# Patient Record
Sex: MALE | Race: WHITE | Employment: FULL TIME | URBAN - METROPOLITAN AREA
[De-identification: names, ages, dates, MRNs, and addresses within clinical notes are randomized per-mention and may not be internally consistent; named-entity substitution may affect disease eponyms.]

---

## 2024-11-15 ENCOUNTER — HOSPITAL ENCOUNTER (OUTPATIENT)
Dept: MRI IMAGING | Age: 64
Discharge: HOME | End: 2024-11-17
Payer: COMMERCIAL

## 2024-11-15 DIAGNOSIS — M47.816 LUMBAR SPONDYLOSIS: ICD-10-CM

## 2024-11-15 DIAGNOSIS — M48.061 FORAMINAL STENOSIS OF LUMBAR REGION: ICD-10-CM

## 2024-11-15 DIAGNOSIS — M54.50 LUMBAR PAIN: ICD-10-CM

## 2024-11-15 PROCEDURE — 72148 MRI LUMBAR SPINE W/O DYE: CPT

## 2025-03-13 ENCOUNTER — OFFICE VISIT (OUTPATIENT)
Facility: CLINIC | Age: 65
End: 2025-03-13
Payer: COMMERCIAL

## 2025-03-13 ENCOUNTER — HOSPITAL ENCOUNTER (OUTPATIENT)
Dept: RADIOLOGY | Facility: HOSPITAL | Age: 65
Discharge: HOME | End: 2025-03-13
Payer: COMMERCIAL

## 2025-03-13 VITALS
SYSTOLIC BLOOD PRESSURE: 148 MMHG | BODY MASS INDEX: 38.45 KG/M2 | TEMPERATURE: 97.7 F | HEIGHT: 68 IN | WEIGHT: 253.7 LBS | HEART RATE: 75 BPM | DIASTOLIC BLOOD PRESSURE: 86 MMHG

## 2025-03-13 DIAGNOSIS — R29.818 NEUROGENIC CLAUDICATION: ICD-10-CM

## 2025-03-13 DIAGNOSIS — R26.9 NEUROLOGIC GAIT DYSFUNCTION: ICD-10-CM

## 2025-03-13 DIAGNOSIS — M43.10 SPONDYLOLISTHESIS, GRADE 2: ICD-10-CM

## 2025-03-13 DIAGNOSIS — M47.816 LUMBAR SPONDYLOSIS: ICD-10-CM

## 2025-03-13 DIAGNOSIS — M48.061 DEGENERATIVE LUMBAR SPINAL STENOSIS: ICD-10-CM

## 2025-03-13 DIAGNOSIS — M47.816 LUMBAR SPONDYLOSIS: Primary | ICD-10-CM

## 2025-03-13 PROCEDURE — 3008F BODY MASS INDEX DOCD: CPT | Performed by: NEUROLOGICAL SURGERY

## 2025-03-13 PROCEDURE — 72120 X-RAY BEND ONLY L-S SPINE: CPT

## 2025-03-13 PROCEDURE — 99204 OFFICE O/P NEW MOD 45 MIN: CPT | Performed by: NEUROLOGICAL SURGERY

## 2025-03-13 PROCEDURE — 1159F MED LIST DOCD IN RCRD: CPT | Performed by: NEUROLOGICAL SURGERY

## 2025-03-13 PROCEDURE — 99214 OFFICE O/P EST MOD 30 MIN: CPT | Mod: GC | Performed by: NEUROLOGICAL SURGERY

## 2025-03-13 PROCEDURE — 1125F AMNT PAIN NOTED PAIN PRSNT: CPT | Performed by: NEUROLOGICAL SURGERY

## 2025-03-13 PROCEDURE — 1036F TOBACCO NON-USER: CPT | Performed by: NEUROLOGICAL SURGERY

## 2025-03-13 RX ORDER — ATENOLOL 25 MG/1
25 TABLET ORAL DAILY
COMMUNITY

## 2025-03-13 RX ORDER — TRIAMCINOLONE ACETONIDE 1 MG/G
CREAM TOPICAL 2 TIMES DAILY PRN
COMMUNITY
Start: 2024-08-12

## 2025-03-13 RX ORDER — ENALAPRIL MALEATE 20 MG/1
20 TABLET ORAL DAILY
COMMUNITY

## 2025-03-13 RX ORDER — TESTOSTERONE CYPIONATE 200 MG/ML
INJECTION, SOLUTION INTRAMUSCULAR
COMMUNITY
Start: 2024-12-06

## 2025-03-13 ASSESSMENT — PATIENT HEALTH QUESTIONNAIRE - PHQ9
1. LITTLE INTEREST OR PLEASURE IN DOING THINGS: NOT AT ALL
SUM OF ALL RESPONSES TO PHQ9 QUESTIONS 1 & 2: 0
2. FEELING DOWN, DEPRESSED OR HOPELESS: NOT AT ALL

## 2025-03-13 ASSESSMENT — PAIN SCALES - GENERAL: PAINLEVEL_OUTOF10: 4

## 2025-03-13 NOTE — PROGRESS NOTES
[facility] [place]  Department of Neurological Surgery  New Patient Visit    History of Present Illness:  Jared Cox is a 64 y.o. year old male who presents to the spine clinic with neurogenic claudication    64-year-old male who is presenting with years of neurogenic claudication.  He states that he has had worsening of his symptoms over the last 5 years.  States that he has some cramping pain in his legs while walking, it is relieved by rest.  States that he feels relief when he flexes forward, especially when he is walking around the store with a shopping cart or doing dishes.  States that he has been having worsening gait instability over the last few years as well.  Does endorse some mild decrease sensation in his hands.  Also states that he has some difficulty buttoning his clothes.  No difficulty feeding himself.  No bowel bladder incontinence.  States that he has known about his L5-S1 spondee for a few years now because he see a chiropractor who showed him he had 1.  He states that he sometimes has a foot drop though this is intermittent.  Says that this has been for many years.    Prior spine surgeries: No  Prior physical therapy: No  Prior spinal injections: No    Diabetic: Yes  Osteoporosis: No  BMI: Body mass index is 38.57 kg/m².    Review of systems negative other than above    There is no problem list on file for this patient.    No past medical history on file.  No past surgical history on file.  Social History     Tobacco Use    Smoking status: Never    Smokeless tobacco: Never   Substance Use Topics    Alcohol use: Not Currently     family history is not on file.    Current Outpatient Medications:     atenolol (Tenormin) 25 mg tablet, Take 1 tablet (25 mg) by mouth once daily., Disp: , Rfl:     enalapril (Vasotec) 20 mg tablet, Take 1 tablet (20 mg) by mouth once daily., Disp: , Rfl:     testosterone cypionate (Depo-Testosterone) 200 mg/mL injection, inject 1 milliliter  intramuscularly EVERY 10 DAYS, Disp: , Rfl:     triamcinolone (Kenalog) 0.1 % cream, Apply topically 2 times a day as needed., Disp: , Rfl:   No Known Allergies    Physical Examination  General: well developed, awake/alert/oriented x3, no distress, alert and cooperative  Head/Neck: neck Supple, no apparent injury  ENMT: mucous membranes moist, no apparent injury, no lesions seen  Cardiovascular: no pitting edema, no JVD  Respiratory/Thorax: Normal breath sounds with good chest expansion, thorax symmetric  Skin: warm and dry, no lesions, no rashes    Neurological/Musculoskeletal:    - Posture: normal coronal & sagittal alignment    - Range of motion: full active & passive range of motion    - Muscle Bulk: normal and symmetric in all extremities    - Paraspinal muscle spasm/tenderness absent    - Motor Strength: 5/5 throughout all extremities    - Sensation: intact to light touch though decreased in bilateral hands.    - Reflexes: normal, no clonus, negative Hairston's sign      Results  I personally reviewed the MRI of the lumbar spine performed on November 15, 2024.  This shows a grade 2 anterolisthesis at L5-S1.  There is autofusion across the disc base.  There is severe central, lateral recess and foraminal stenosis at this level.  There is also a central disc bulge at L4-5.  There is moderate central stenosis at this level and moderate to severe left foraminal stenosis.  There is also a large disc bulge at T12-L1.  This appears to result in at least moderate to severe central stenosis, although this level is not included in the axial slices.      Assessment and Plan:  Jared Cox is a 64 y.o. year old male who presents to the spine clinic with neurogenic claudication    Overall patient is coming in with slowly progressive symptoms though unable to determine if all of this is related to his L5-S1 spondy.  Discussed that he does have signs and symptoms of mild myelopathy, and that he would benefit from further  imaging to delineate this.  Will plan to obtain an MRI C and T-spine without contrast to determine if there are any compressive lesions within the spinal cord.  Will also obtain a lumbar spine flexion-extension film to determine if there is any pathologic movement.  Will also place a referral to physical therapy in the interim.  Patient should return to clinic in 8 to 12 weeks after imaging has been done and he has trialed physical therapy to determine what surgical intervention needs done.        Mic Harris MD PhD  Attending Neurosurgeon  [facility]   of Neurological Surgery  [facility]  Office: [phone]  Fax: [phone]

## 2025-04-02 ENCOUNTER — HOSPITAL ENCOUNTER (OUTPATIENT)
Dept: RADIOLOGY | Facility: HOSPITAL | Age: 65
Discharge: HOME | End: 2025-04-02
Payer: COMMERCIAL

## 2025-04-02 DIAGNOSIS — M47.816 LUMBAR SPONDYLOSIS: ICD-10-CM

## 2025-04-02 PROCEDURE — 72146 MRI CHEST SPINE W/O DYE: CPT

## 2025-04-02 PROCEDURE — 72141 MRI NECK SPINE W/O DYE: CPT

## 2025-05-22 ENCOUNTER — OFFICE VISIT (OUTPATIENT)
Facility: CLINIC | Age: 65
End: 2025-05-22
Payer: COMMERCIAL

## 2025-05-22 VITALS
SYSTOLIC BLOOD PRESSURE: 148 MMHG | HEART RATE: 61 BPM | HEIGHT: 68 IN | DIASTOLIC BLOOD PRESSURE: 90 MMHG | TEMPERATURE: 97.9 F | WEIGHT: 256.7 LBS | BODY MASS INDEX: 38.91 KG/M2

## 2025-05-22 DIAGNOSIS — M48.061 DEGENERATIVE LUMBAR SPINAL STENOSIS: ICD-10-CM

## 2025-05-22 DIAGNOSIS — M43.17 SPONDYLOLISTHESIS AT L5-S1 LEVEL: ICD-10-CM

## 2025-05-22 DIAGNOSIS — R29.818 NEUROGENIC CLAUDICATION: ICD-10-CM

## 2025-05-22 DIAGNOSIS — M47.14 SPONDYLOSIS WITH MYELOPATHY, THORACIC REGION: Primary | ICD-10-CM

## 2025-05-22 PROCEDURE — 1159F MED LIST DOCD IN RCRD: CPT | Performed by: NEUROLOGICAL SURGERY

## 2025-05-22 PROCEDURE — 1125F AMNT PAIN NOTED PAIN PRSNT: CPT | Performed by: NEUROLOGICAL SURGERY

## 2025-05-22 PROCEDURE — 99214 OFFICE O/P EST MOD 30 MIN: CPT | Performed by: NEUROLOGICAL SURGERY

## 2025-05-22 PROCEDURE — 3008F BODY MASS INDEX DOCD: CPT | Performed by: NEUROLOGICAL SURGERY

## 2025-05-22 PROCEDURE — 1036F TOBACCO NON-USER: CPT | Performed by: NEUROLOGICAL SURGERY

## 2025-05-22 RX ORDER — TADALAFIL 20 MG/1
20 TABLET ORAL DAILY PRN
COMMUNITY
Start: 2025-04-18

## 2025-05-22 RX ORDER — LINAGLIPTIN AND METFORMIN HYDROCHLORIDE 5; 1000 MG/1; MG/1
1 TABLET, FILM COATED, EXTENDED RELEASE ORAL DAILY
COMMUNITY
Start: 2025-04-18

## 2025-05-22 RX ORDER — RAMELTEON 8 MG/1
8 TABLET ORAL NIGHTLY
COMMUNITY
Start: 2025-04-18 | End: 2026-04-18

## 2025-05-22 ASSESSMENT — PATIENT HEALTH QUESTIONNAIRE - PHQ9
2. FEELING DOWN, DEPRESSED OR HOPELESS: NOT AT ALL
1. LITTLE INTEREST OR PLEASURE IN DOING THINGS: NOT AT ALL
SUM OF ALL RESPONSES TO PHQ9 QUESTIONS 1 & 2: 0

## 2025-05-22 ASSESSMENT — PAIN SCALES - GENERAL: PAINLEVEL_OUTOF10: 3

## 2025-05-22 NOTE — PROGRESS NOTES
"[facility] [place]  Department of Neurological Surgery  Established Patient Visit    History of Present Illness  Jared Cox is a 64 y.o. year old male who presents to the spine clinic in follow up with chronic progressive balance/gait dysfunction.  He describes worsening heaviness throughout both legs as well as a \"pressure\" sensation in his lower back.  He also describes cramping pain throughout both legs.  His prior lumbar MRI showed a grade 3 anterolisthesis at L5-S1 with associated pars defects.  There is severe central stenosis at this level.  When he last saw him in March 2025, I had ordered MRIs of the cervical and thoracic spine for further evaluation.  He is here today to go over the results of those studies and for ongoing treatment discussion.  He denies any significant neck pain or any upper extremity symptoms.    BMI: Body mass index is 39.03 kg/m².    14/14 systems reviewed and negative other than what is listed in the history of present illness    Problem List[1]  Medical History[2]  Surgical History[3]  Social History     Tobacco Use    Smoking status: Never    Smokeless tobacco: Never   Substance Use Topics    Alcohol use: Not Currently     family history is not on file.  Current Medications[4]  Allergies[5]    Physical Examination:  General: well developed, awake/alert/oriented x3, no distress, alert and cooperative  Head/Neck: neck Supple, no apparent injury  ENMT: mucous membranes moist, no apparent injury, no lesions seen  Cardiovascular: no pitting edema, no JVD  Respiratory/Thorax: Normal breath sounds with good chest expansion, thorax symmetric  Skin: warm and dry, no lesions, no rashes    Neurological/Musculoskeletal:    - Posture: normal coronal & sagittal alignment    - Range of motion: full active & passive range of motion    - Muscle Bulk: normal and symmetric in all extremities    - Paraspinal muscle spasm/tenderness absent    - Motor Strength: 5/5 throughout all " "extremities    - Sensation: intact to light touch    - Reflexes: normal, no clonus, negative Hairston's sign      Results:  I personally reviewed and interpreted the imaging results, which included MRIs of the cervical and thoracic spine performed on April 2, 2025.  In the cervical spine, there is a broad-based disc bulge at C6-7 there results in some cord formation but no significant stenosis.  There is also some possible cord signal change at C3-4, C4-5, C5-6 and C6-7.  In the thoracic spine, there are large right-sided disc herniations at T11-12 and T12-L1.  The disc herniation at T11-12 has significant rostral migration.  There is significant leftward displacement of the spinal cord at both levels.  There is moderate to severe central stenosis at T11-12 and severe central stenosis at T12-L1.  There is faint cord signal change at these 2 levels.    Assessment and Plan:  Jared Cox is a 64 y.o. year old male who presents to the spine clinic in follow up with chronic progressive balance/gait dysfunction.  He describes worsening heaviness throughout both legs as well as a \"pressure\" sensation in his lower back.  He also describes cramping pain throughout both legs.  His prior lumbar MRI showed a grade 3 anterolisthesis at L5-S1 with associated pars defects.  There is severe central stenosis at this level.  When he last saw him in March 2025, I had ordered MRIs of the cervical and thoracic spine for further evaluation.  He is here today to go over the results of those studies and for ongoing treatment discussion.  He denies any significant neck pain or any upper extremity symptoms.  His cervical MRI shows a broad-based disc bulge at C6-7 there results in some cord formation but no significant stenosis.  There is also some possible cord signal change at C3-4, C4-5, C5-6 and C6-7.  His thoracic MRI shows large right-sided disc herniations at T11-12 and T12-L1.  The disc herniation at T11-12 has significant rostral " migration.  There is significant leftward displacement of the spinal cord at both levels.  There is moderate to severe central stenosis at T11-12 and severe central stenosis at T12-L1.  There is faint cord signal change at these 2 levels.  I had a lengthy discussion with the patient regarding his condition and treatment options.  I told him that I think that he has symptomatic thoracic myelopathy.  I discussed multiple treatment options with the patient, including observation, conservative care and different surgical approaches.  I told him that surgery would likely entail T11-L1 laminectomies and a two-level transpedicular approach for decompression, followed by a T10-L2 posterior fusion.  He has nothing about his options and will let me know if he wants to proceed with surgery.  In the meantime, I am going to order CTs of the thoracic and lumbar spine for further evaluation.      I have reviewed all prior documentation and reviewed the electronic medical record since admission. I have personally have reviewed all advanced imaging not just the reports and used my interpretation as documented as the relevant findings. I have reviewed the risks and benefits of all treatment recommendations listed in this note with the patient and family.       The above clinical summary has been dictated with voice recognition software. It has not been proofread for grammatical errors, typographical mistakes, or other semantic inconsistencies.    Thank you for visiting our office today. It was our pleasure to take part in your healthcare.     Do not hesitate to call with any questions regarding your plan of care after leaving at [phone] M-F 8am-4pm.     To clinicians, thank you very much for this kind referral. It is a privilege to partner with you in the care of your patients. My office would be delighted to assist you with any further consultations or with questions regarding the plan of care outlined. Do not hesitate to  call the office or contact me directly.       Sincerely,      Mic Harris MD PhD  Attending Neurosurgeon  [facility]   of Neurological Surgery  [facility]    [address]  [place], [place] [address] [place] [address]  [address] [facility]  [address]  [place] [address]    Office: [phone]  Fax: [phone]           [1] There is no problem list on file for this patient.  [2] No past medical history on file.  [3] No past surgical history on file.  [4]   Current Outpatient Medications:     atenolol (Tenormin) 25 mg tablet, Take 1 tablet (25 mg) by mouth once daily., Disp: , Rfl:     enalapril (Vasotec) 20 mg tablet, Take 1 tablet (20 mg) by mouth once daily., Disp: , Rfl:     Jentadueto XR 5-1,000 mg tablet, IR - ER, biphasic 24hr, Take 1 tablet by mouth once daily., Disp: , Rfl:     ramelteon (Rozerem) 8 mg tablet, Take 1 tablet (8 mg) by mouth once daily at bedtime., Disp: , Rfl:     tadalafil 20 mg tablet, Take 1 tablet (20 mg) by mouth once daily as needed., Disp: , Rfl:     testosterone cypionate (Depo-Testosterone) 200 mg/mL injection, inject 1 milliliter intramuscularly EVERY 10 DAYS, Disp: , Rfl:     triamcinolone (Kenalog) 0.1 % cream, Apply topically 2 times a day as needed., Disp: , Rfl:   [5] No Known Allergies

## 2025-05-30 DIAGNOSIS — R29.818 NEUROGENIC CLAUDICATION: ICD-10-CM

## 2025-05-30 DIAGNOSIS — M43.17 SPONDYLOLISTHESIS AT L5-S1 LEVEL: ICD-10-CM

## 2025-05-30 DIAGNOSIS — M48.061 SPINAL STENOSIS OF LUMBAR REGION, UNSPECIFIED WHETHER NEUROGENIC CLAUDICATION PRESENT: ICD-10-CM

## 2025-05-30 DIAGNOSIS — M47.14 SPONDYLOSIS WITH MYELOPATHY, THORACIC REGION: Primary | ICD-10-CM

## 2025-06-05 ENCOUNTER — HOSPITAL ENCOUNTER (OUTPATIENT)
Dept: RADIOLOGY | Facility: HOSPITAL | Age: 65
Discharge: HOME | End: 2025-06-05
Payer: COMMERCIAL

## 2025-06-05 DIAGNOSIS — M43.17 SPONDYLOLISTHESIS AT L5-S1 LEVEL: ICD-10-CM

## 2025-06-05 DIAGNOSIS — M47.14 SPONDYLOSIS WITH MYELOPATHY, THORACIC REGION: ICD-10-CM

## 2025-06-05 PROCEDURE — 72131 CT LUMBAR SPINE W/O DYE: CPT

## 2025-06-05 PROCEDURE — 72128 CT CHEST SPINE W/O DYE: CPT

## 2025-06-19 ENCOUNTER — APPOINTMENT (OUTPATIENT)
Facility: CLINIC | Age: 65
End: 2025-06-19
Payer: COMMERCIAL

## 2025-07-14 ENCOUNTER — APPOINTMENT (OUTPATIENT)
Dept: PREADMISSION TESTING | Facility: HOSPITAL | Age: 65
End: 2025-07-14
Payer: COMMERCIAL

## 2025-07-16 ENCOUNTER — APPOINTMENT (OUTPATIENT)
Dept: PREADMISSION TESTING | Facility: HOSPITAL | Age: 65
End: 2025-07-16
Payer: COMMERCIAL

## 2025-07-16 NOTE — CPM/PAT H&P
CPM/PAT Evaluation       Name: Jared Cox (Jared Cox)  /Age: 1960/64 y.o.     {[facility] Visit Type:23264}      Chief Complaint: ***    HPI    Medical History[1]    Surgical History[2]    Patient  has no history on file for sexual activity.    Family History[3]    Allergies[4]      Jared Cox is a 64 y.o. Male scheduled for T11-L1 transpedicular decompression; T10-L2 posterior fusion on 2025 with Dr. Harris           Prior to Admission medications   Medication Sig Start Date End Date Taking? Authorizing Provider   atenolol (Tenormin) 25 mg tablet Take 1 tablet (25 mg) by mouth once daily.    Historical Provider, MD   enalapril (Vasotec) 20 mg tablet Take 1 tablet (20 mg) by mouth once daily.    Historical Provider, MD   Jentadueto XR 5-1,000 mg tablet, IR - ER, biphasic 24hr Take 1 tablet by mouth once daily. 25   Historical Provider, MD   ramelteon (Rozerem) 8 mg tablet Take 1 tablet (8 mg) by mouth once daily at bedtime. 25  Historical Provider, MD   tadalafil 20 mg tablet Take 1 tablet (20 mg) by mouth once daily as needed. 25   Historical Provider, MD   testosterone cypionate (Depo-Testosterone) 200 mg/mL injection inject 1 milliliter intramuscularly EVERY 10 DAYS 24   Historical Provider, MD   triamcinolone (Kenalog) 0.1 % cream Apply topically 2 times a day as needed. 24   Historical Provider, MD RICH ROS     PAT Physical Exam     Airway    Visit Vitals  Smoking Status Never       DASI Risk Score    No data to display  Caprini DVT Assessment    No data to display  Modified Frailty Index    No data to display  [id number] Stroke Risk Points  Current as of just now        N/A 0 to 9 Points:      Last Change: N/A          The [id number] risk score (Lip LISANDRA, et al. 2009. © 2010 American College [employer]) quantifies the risk of stroke for a patient with atrial fibrillation. For patients without atrial fibrillation or under the age of 18 this  score appears as N/A. Higher score values generally indicate higher risk of stroke.        This score is not applicable to this patient. Components are not calculated.          Revised Cardiac Risk Index    No data to display  Apfel Simplified Score    No data to display  Risk Analysis Index Results This Encounter    No data found in the last 10 encounters.       Prodigy: High Risk  Total Score: 16              Prodigy Age Score      Prodigy Gender Score          ARISCAT Score for Postoperative Pulmonary Complications    No data to display  [place]ro Perioperative Risk for Myocardial Infarction or Cardiac Arrest (HAZEL)    No data to display                                                                                                             PAT nurse screening call        Summary:       Jared Cox is a 64 y.o. Male scheduled for T11-L1 transpedicular decompression; T10-L2 posterior fusion on 8/4/2025 with Dr. Harris         TESTING:        A1C: 7.2% as of 7/14/25        ECG 12 Lead: 7/11/2025:          CT Thoracic Spine: 6/8/2025:  IMPRESSION:   T11/T12: Severe central canal stenosis.   Severe multilevel degenerative changes with hypertrophic ankylosis.         CT Lumbar Spine: 6/8/2025:  IMPRESSION:   Severe multilevel degenerative changes.   L4/L5: Moderate central canal stenosis.   L5/S1: Solid osseous fusion. Spondylolysis with 2 anterolisthesis   with severe right and severe left foraminal stenosis.         MR Cervical Spine/Thoracic Spine: 4/2/2025:  IMPRESSION:  There is a dextrocurvature of the thoracic spine. There is mild  exaggeration of the kyphotic curvature of the thoracic spine.  There are minimal chronic anterior compressive changes involving the  T11, T10, and T9 vertebrae. There is multilevel spondylosis within  the thoracic and lumbar region as described above the most pronounced  spinal canal narrowing is noted at the T11/12 and T12/L1 levels as  noted  above.          PROVIDERS/SPECIALIST:          PCP: Chu Lucas DO ([facility]), LOV 7/8/2025, presents for presurgical clearance         Neurosurgery: Mic Harris MD (), LOV 5/22/2025, presents for follow up r/t chronic progressive balance/gait dysfunction and chronic back pain  Surgery would likely entail T11-L1 laminectomies and a two-level transpedicular approach for decompression, followed by a T10-L2 posterior fusion.   I am going to order CTs of the thoracic and lumbar spine for further evaluation.   --------------------------------------------------------------------------------------        Nurse Plan of Action: NA        Follow Up/Update:  7/16/2025:  -Contacted patient's spouse at [phone], confirmed meds/allergies and all medical/surgical information      Eb Martell RN  Preadmission Testing       Assessment and Plan:     {[facility] EMBEDDED ASSESSMENT AND PLAN:04725}             [1]   Past Medical History:  Diagnosis Date    Chronic pain disorder     CKD (chronic kidney disease)     Stage 3a    Hyperlipidemia     Hypertension     Insomnia     Low testosterone     Myalgia     Neurogenic claudication     Obesity     Psoriasis     Skin disorder     Spinal stenosis of lumbar region     Scheduled for surgery with Dr. Harris on 8/4/2025, decompression and fusion    Spondylosis with myelopathy, lumbar region     Type 2 diabetes mellitus    [2]   Past Surgical History:  Procedure Laterality Date    COLONOSCOPY  2014    OTHER SURGICAL HISTORY  2019    Callus removal    SHOULDER SURGERY Left 2010   [3]   Family History  Problem Relation Name Age of Onset    Stroke Father      Hypertension Father      Hyperlipidemia Sister      Diabetes Sister      Hyperlipidemia Brother      Diabetes Brother     [4] No Known Allergies

## 2025-07-18 DIAGNOSIS — Z98.1 S/P SPINAL FUSION: Primary | ICD-10-CM

## 2025-07-20 NOTE — CPM/PAT H&P
CPM/PAT Evaluation       Name: Jared Cox (Jared Cox)  /Age: 1960/64 y.o.     Visit Type:   In-Person       Chief Complaint: Perioperative visit     HPI 65 y/o male scheduled for T11-L1 transpedicular decompression; T10-L2 posterior fusion on 25 secondary to Spondylosis with myelopathy, thoracic region, Spinal stenosis of lumbar region, unspecified whether neurogenic claudication present,   Neurogenic claudication, Spondylolisthesis at L5-S1 level with Dr. Mic Harris who referred to CPM.  Presents to CPM today for perioperative risk stratification and optimization. PMHX includes anxiety, chronic pain, CKD, GERD, hiatal hernia, HLD, HTN, insomnia, obesity and DM2.       Medical History[1]    Surgical History[2]    Patient Sexual activity questions deferred to the physician.    Family History[3]    Allergies[4]    Prior to Admission medications   Medication Sig Start Date End Date Taking? Authorizing Provider   atenolol (Tenormin) 25 mg tablet Take 1 tablet (25 mg) by mouth once daily.    Historical Provider, MD   enalapril (Vasotec) 20 mg tablet Take 1 tablet (20 mg) by mouth once daily.    Historical Provider, MD   Jentadueto XR 5-1,000 mg tablet, IR - ER, biphasic 24hr Take 1 tablet by mouth once daily. 25   Historical Provider, MD   ramelteon (Rozerem) 8 mg tablet Take 1 tablet (8 mg) by mouth once daily at bedtime. 25  Historical Provider, MD   tadalafil 20 mg tablet Take 1 tablet (20 mg) by mouth once daily as needed. 25   Historical Provider, MD   testosterone cypionate (Depo-Testosterone) 200 mg/mL injection inject 1 milliliter intramuscularly EVERY 10 DAYS 24   Historical Provider, MD   triamcinolone (Kenalog) 0.1 % cream Apply topically 2 times a day as needed. 24   Historical Provider, MD RICH ROS:   Constitutional:   neg    Neuro/Psych:    numbness (bilateral feet)  Eyes:   neg    Ears:   neg    Nose:    sinus congestion (congestion for about 1  "week, starting to improve)  Mouth:   neg    Throat:   neg    Neck:   neg    Cardio:   neg    Respiratory:    cough  Endocrine:   neg    GI:   neg    :   neg    Musculoskeletal:   neg    Hematologic:   neg    Skin:  neg        Physical Exam  Vitals reviewed.   Constitutional:       Appearance: Normal appearance. He is obese.   HENT:      Head: Normocephalic.      Nose: Congestion present.      Mouth/Throat:      Pharynx: Oropharynx is clear.     Eyes:      Pupils: Pupils are equal, round, and reactive to light.       Cardiovascular:      Rate and Rhythm: Normal rate and regular rhythm.      Pulses: Normal pulses.      Heart sounds: Normal heart sounds.   Pulmonary:      Effort: Pulmonary effort is normal.      Breath sounds: Normal breath sounds.   Genitourinary:     Comments: Not examined     Musculoskeletal:         General: Normal range of motion.      Cervical back: Normal range of motion.      Comments: No back pain but states his legs will feel heavy - left knee soft brace on     Skin:     General: Skin is warm and dry.     Neurological:      General: No focal deficit present.      Mental Status: He is alert and oriented to person, place, and time.     Psychiatric:         Mood and Affect: Mood normal.         Behavior: Behavior normal.         Thought Content: Thought content normal.         Judgment: Judgment normal.          PAT AIRWAY:   Airway:     Mallampati::  III    TM distance::  >3 FB    Neck ROM::  Full      Visit Vitals  /88   Pulse 63   Temp 36.1 °C (97 °F)   Ht 1.727 m (5' 8\")   Wt 114 kg (252 lb 1.6 oz)   SpO2 95%   BMI 38.33 kg/m²   Smoking Status Never   BSA 2.34 m²       DASI Risk Score      Flowsheet Row Pre-Admission Testing from 7/21/2025 in [facility]   Can you take care of yourself (eat, dress, bathe, or use toilet)?  2.75 filed at 07/21/2025 0804   Can you walk indoors, such as around your house? 1.75 filed at 07/21/2025 0804   Can you walk a block or two on " level ground?  2.75 filed at 07/21/2025 0804   Can you climb a flight of stairs or walk up a hill? 5.5 filed at 07/21/2025 0804   Can you run a short distance? 8 filed at 07/21/2025 0804   Can you do light work around the house like dusting or washing dishes? 2.7 filed at 07/21/2025 0804   Can you do moderate work around the house like vacuuming, sweeping floors or carrying groceries? 3.5 filed at 07/21/2025 0804   Can you do heavy work around the house like scrubbing floors or lifting and moving heavy furniture?  8 filed at 07/21/2025 0804   Can you do yard work like raking leaves, weeding or pushing a mower? 4.5 filed at 07/21/2025 0804   Can you have sexual relations? 5.25 filed at 07/21/2025 0804   Can you participate in moderate recreational activities like golf, bowling, dancing, doubles tennis or throwing a baseball or football? 6 filed at 07/21/2025 0804   Can you participate in strenous sports like swimming, singles tennis, football, basketball, or skiing? 0 filed at 07/21/2025 0804   DASI SCORE 50.7 filed at 07/21/2025 0804   METS Score (Will be calculated only when all the questions are answered) 9 filed at 07/21/2025 0804     Caprini DVT Assessment      Flowsheet Row Pre-Admission Testing from 7/21/2025 in [facility]   DVT Score (IF A SCORE IS NOT CALCULATING, MUST SELECT A BMI TO COMPLETE) 10 filed at 07/21/2025 0841   Surgical Factors Major surgery planned, lasting over 3 hours filed at 07/21/2025 0841   BMI (BMI MUST BE CHOSEN) 31-40 (Obesity) filed at 07/21/2025 0841     Modified Frailty Index      Flowsheet Row Pre-Admission Testing from 7/21/2025 in [facility]   Non-independent functional status (problems with dressing, bathing, personal grooming, or cooking) 0 filed at 07/21/2025 0842   History of diabetes mellitus  0.0909 filed at 07/21/2025 0842   History of COPD 0 filed at 07/21/2025 0842   History of CHF No filed at 07/21/2025 0842   History of MI 0 filed at  07/21/2025 0842   History of Percutaneous Coronary Intervention, Cardiac Surgery, or Angina No filed at 07/21/2025 0842   Hypertension requiring the use of medication  0.0909 filed at 07/21/2025 0842   Peripheral vascular disease 0 filed at 07/21/2025 0842   Impaired sensorium (cognitive impairement or loss, clouding, or delirium) 0 filed at 07/21/2025 0842   TIA or CVA withouy residual deficit 0 filed at 07/21/2025 0842   Cerebrovascular accident with deficit 0 filed at 07/21/2025 0842   Modified Frailty Index Calculator .1818 filed at 07/21/2025 0842     [id number] Stroke Risk Points         N/A 0 to 9 Points:      Last Change: N/A          The [id number] risk score (Lip LISANDRA, et al. 2009. © 2010 [employer]) quantifies the risk of stroke for a patient with atrial fibrillation. For patients without atrial fibrillation or under the age of 18 this score appears as N/A. Higher score values generally indicate higher risk of stroke.        This score is not applicable to this patient. Components are not calculated.          Revised Cardiac Risk Index      Flowsheet Row Pre-Admission Testing from 7/21/2025 in [facility]   High-Risk Surgery (Intraperitoneal, Intrathoracic,Suprainguinal vascular) 1 filed at 07/21/2025 0841   History of ischemic heart disease (History of MI, History of positive exercuse test, Current chest paint considered due to myocardial ischemia, Use of nitrate therapy, ECG with pathological Q Waves) 0 filed at 07/21/2025 0841   History of congestive heart failure (pulmonary edemia, bilateral rales or S3 gallop, Paroxysmal nocturnal dyspnea, CXR showing pulmonary vascular redistribution) 0 filed at 07/21/2025 0841   History of cerebrovascular disease (Prior TIA or stroke) 0 filed at 07/21/2025 0841   Pre-operative insulin treatment 0 filed at 07/21/2025 0841   Pre-operative creatinine>2 mg/dl 0 filed at 07/21/2025 0841   Revised Cardiac Risk Calculator 1  filed at 07/21/2025 0841     Apfel Simplified Score      Flowsheet Row Pre-Admission Testing from 7/21/2025 in [facility]   Smoking status 1 filed at 07/21/2025 0842   History of motion sickness or PONV  0 filed at 07/21/2025 0842   Use of postoperative opioids 1 filed at 07/21/2025 0842   Gender - Female 0=No filed at 07/21/2025 0842   Apfel Simplified Score Calculator 2 filed at 07/21/2025 0842     Risk Analysis Index Results This Encounter    No data found in the last 10 encounters.       Stop Bang Score      Flowsheet Row Pre-Admission Testing from 7/21/2025 in [facility]   Do you snore loudly? 0 filed at 07/21/2025 0804   Do you often feel tired or fatigued after your sleep? 0 filed at 07/21/2025 0804   Has anyone ever observed you stop breathing in your sleep? 0 filed at 07/21/2025 0804   Do you have or are you being treated for high blood pressure? 1 filed at 07/21/2025 0804   Recent BMI (Calculated) 39 filed at 07/21/2025 0804   Is BMI greater than 35 kg/m2? 1=Yes filed at 07/21/2025 0804   Age older than 50 years old? 1=Yes filed at 07/21/2025 0804   Is your neck circumference greater than 17 inches (Male) or 16 inches (Female)? 1 filed at 07/21/2025 0804   Gender - Male 1=Yes filed at 07/21/2025 0804   STOP-BANG Total Score 5 filed at 07/21/2025 0804     Prodigy: High Risk  Total Score: 16              Prodigy Age Score      Prodigy Gender Score          ARISCAT Score for Postoperative Pulmonary Complications      Flowsheet Row Pre-Admission Testing from 7/21/2025 in [facility]   Age Calculated Score 3 filed at 07/21/2025 0842   Preoperative SpO2 8 filed at 07/21/2025 0842   Respiratory infection in the last month Either upper or lower (i.e., URI, bronchitis, pneumonia), with fever and antibiotic treatment 0 filed at 07/21/2025 0842   Preoperative anemia (Hgb less than 10 g/dl) 0 filed at 07/21/2025 0842   Surgical incision  0 filed at 07/21/2025 0856    Duration of surgery  23 filed at 07/21/2025 0842   Emergency Procedure  0 filed at 07/21/2025 0842   ARISCAT Total Score  34 filed at 07/21/2025 0842     Porter Perioperative Risk for Myocardial Infarction or Cardiac Arrest (HAZEL)    No data to display      PROVIDERS/SPECIALIST:  PCP: Chu Lucas DO ([facility])  Neurosurgery: Mic Harris MD    Assessment and Plan:     Anesthesia  The patient denies problems with anesthesia in the past such as PONV, prolonged sedation, awareness, dental damage, aspiration, cardiac arrest, difficult intubation, or unexpected hospital admissions.     Airway  No documented or reported history of airway difficulty.     Neurology  #Insomnia  - Medicated on Ramelteon (continue)    The patient is at increased risk for postoperative delirium secondary to age 65 or older. The patient is at increased risk for perioperative stroke secondary to hypertension , increased age, general anesthesia, operative time >2.5 hours. Handouts for preoperative brain exercises given to patient.    HEENT  #Sinus congestion, started 7/17 - has not required any ATB use, no fever or chills on today's exam. No signs or symptoms of infection, instructed patient if congestion gets worse and you require an ATB contact the surgeons office right away to let them know.     Cardiovascular  #HTN  - Medicated on enalapril (hold 24 hr prior to surgery)    #HLD  - Taking OTC supplement Krill oil (hold on 7/28)    BP: 142/88  EKG 7/11/25: (From [facility], uploaded into chart)  Normal sinus rhythm    Normal ECG    Cardiology Evaluation  The patient is not followed by cardiology.    METS  The patient's functional capacity is greater than 4 METS.  RCRI  The patient meets 1 RCRI criteria and therefore has a 6% risk of major adverse cardiac complications.  HAZEL score which indicates a 0.1% risk of intraoperative or 30-day postoperative MACE (major adverse cardiac event).    Patient is scheduled for non-cardiac surgery associated  with elevated risk. The patient has no major cardiac contraindications to non- cardiac surgery.    Pulmonary  #H/o asthma 30 years ago per patient he thinks it was environmental, not requiring use of inhalers currently     No significant findings on chart review or clinical presentation and evaluation. The patient is at increased risk of perioperative pulmonary complications secondary to advanced age greater than 60.    STOP BANG 5, which places patient at high risk for having CURT.  ARISCAT 34, Intermediate, 13.3% risk of in-hospital postoperative pulmonary complications  PRODIGY 21, high risk of respiratory depression episode.     Patient given PI sheet for preoperative deep breathing exercises.  Encourage  incentive spirometry in the postoperative period as deemed necessary.    Endocrine  #DM2 - Last A1C: 7.2% as of 7/14/25  - Medicated on Jentadueto XR (hold 24 hr prior to surgery)    #Low testosterone  - on supplements    Gastrointestinal  #Hiatal hernia - not causing any symptoms or pain    #GERD  - Takes OTC Prilosec as needed     Eat 10- 2,  self-perceived oropharyngeal dysphagia scale (0-40)     Genitourinary  No diagnoses or significant findings on chart review or clinical presentation and evaluation.    Renal  #CKD stage 3a, managed by PCP - Last BUN 31 and CRE 1.07 on 7/14/25    The patient has a history of chronic kidney disease most consistent with stage 3a.  Patient's renal function appears unchanged when compared to prior labs. The patient has specific risk factors associated with increased risk of perioperative renal complications related to age greater than 55, male gender, hypertension, diabetes mellitus, CKD. Preventative measures include preoperative hydration.    Hematology  No diagnoses or significant findings on chart review or clinical presentation and evaluation.    Caprini score 10, high risk of perioperative VTE.     Patient instructed to ambulate as soon as possible postoperatively to  "decrease thromboembolic risk. Initiate mechanical DVT prophylaxis as soon as possible and initiate chemical prophylaxis when deemed safe from a bleeding standpoint post surgery.     Transfusion Evaluation  A type and screen was obtained given the likelihood for perioperative transfusion of blood or blood products.    Musculoskeletal  #Spondylosis with myelopathy, thoracic region,   Spinal stenosis of lumbar region, Neurogenic claudication, Spondylolisthesis at L5-S1 level  - Patient has seen Dr. Harris with neurosurgery on 5/22/25, per note \"follow up with chronic progressive balance/gait dysfunction, I told him that surgery would likely entail T11-L1 laminectomies and a two-level transpedicular approach for decompression, followed by a T10-L2 posterior fusion\".    ID  No diagnoses or significant findings on chart review or clinical presentation and evaluation. MRSA screening obtained. Prescriptions and instructions given for Hibiclens and Peridex.    Diagnostic Results      - CXR pending on 7/21/25    CT Thoracic Spine: 6/8/2025:  T11/T12: Severe central canal stenosis.   Severe multilevel degenerative changes with hypertrophic ankylosis.     CT Lumbar Spine: 6/8/2025:  Severe multilevel degenerative changes.   L4/L5: Moderate central canal stenosis.   L5/S1: Solid osseous fusion. Spondylolysis with 2 anterolisthesis with severe right and severe left foraminal stenosis.     MR Cervical Spine/Thoracic Spine: 4/2/2025:  There is a dextrocurvature of the thoracic spine. There is mild exaggeration of the kyphotic curvature of the thoracic spine. There are minimal chronic anterior compressive changes involving the T11, T10, and T9 vertebrae. There is multilevel spondylosis within  the thoracic and lumbar region as described above the most pronounced spinal canal narrowing is noted at the T11/12 and T12/L1 levels as noted above.    Recent Results (from the past 4 weeks)   Basic Metabolic Panel    Collection Time: " 07/21/25  8:33 AM   Result Value Ref Range    Glucose 163 (H) 74 - 99 mg/dL    Sodium 135 (L) 136 - 145 mmol/L    Potassium 4.8 3.5 - 5.3 mmol/L    Chloride 104 98 - 107 mmol/L    Bicarbonate 22 21 - 32 mmol/L    Anion Gap 14 10 - 20 mmol/L    Urea Nitrogen 19 6 - 23 mg/dL    Creatinine 1.04 0.50 - 1.30 mg/dL    eGFR 80 >60 mL/min/1.73m*2    Calcium 8.8 8.6 - 10.6 mg/dL   Prealbumin    Collection Time: 07/21/25  8:33 AM   Result Value Ref Range    Prealbumin 28.7 18.0 - 40.0 mg/dL   CBC and Auto Differential    Collection Time: 07/21/25  8:33 AM   Result Value Ref Range    WBC 5.9 4.4 - 11.3 x10*3/uL    nRBC 0.0 0.0 - 0.0 /100 WBCs    RBC 6.11 (H) 4.50 - 5.90 x10*6/uL    Hemoglobin 17.6 (H) 13.5 - 17.5 g/dL    Hematocrit 53.4 (H) 41.0 - 52.0 %    MCV 87 80 - 100 fL    MCH 28.8 26.0 - 34.0 pg    MCHC 33.0 32.0 - 36.0 g/dL    RDW 15.2 (H) 11.5 - 14.5 %    Platelets 170 150 - 450 x10*3/uL    Neutrophils % 54.5 40.0 - 80.0 %    Immature Granulocytes %, Automated 0.3 0.0 - 0.9 %    Lymphocytes % 29.7 13.0 - 44.0 %    Monocytes % 10.2 2.0 - 10.0 %    Eosinophils % 4.6 0.0 - 6.0 %    Basophils % 0.7 0.0 - 2.0 %    Neutrophils Absolute 3.21 1.20 - 7.70 x10*3/uL    Immature Granulocytes Absolute, Automated 0.02 0.00 - 0.70 x10*3/uL    Lymphocytes Absolute 1.75 1.20 - 4.80 x10*3/uL    Monocytes Absolute 0.60 0.10 - 1.00 x10*3/uL    Eosinophils Absolute 0.27 0.00 - 0.70 x10*3/uL    Basophils Absolute 0.04 0.00 - 0.10 x10*3/uL   Staphylococcus aureus/MRSA colonization, Culture    Collection Time: 07/21/25  8:33 AM    Specimen: Anterior Nares; Swab   Result Value Ref Range    Staph/MRSA Screen Culture (A)      Isolated: Methicillin Susceptible Staphylococcus aureus (MSSA)   Type And Screen Is this order related to pregnancy or an upcoming surgery? Yes; Where will this surgery/delivery be performed? [facility]; What is the date of the surgery? 8/4/2025; Has this patient ever had a transfusion? No; Has th...     Collection Time: 07/21/25  8:33 AM   Result Value Ref Range    ABO TYPE A     Rh TYPE POS     ANTIBODY SCREEN NEG       - Labs collected today: BMP, Prealbumin, CBC w/ diff, MRSA and T/s   - Labs and diagnostic testing reviewed on 7/21/25  - MSSA positive     -Preoperative medication instructions were provided and reviewed with the patient.  Any additional testing or evaluation was explained to the patient.  NPO Instructions were discussed, and the patient's questions were answered prior to conclusion of this encounter. Patient verbalized understanding of preoperative instructions. After Visit Summary given.                     [1]   Past Medical History:  Diagnosis Date    Chronic pain disorder     CKD (chronic kidney disease)     Stage 3a    GERD (gastroesophageal reflux disease)     Hiatal hernia     Hyperlipidemia     Hypertension     Insomnia     Low testosterone     Myalgia     Neurogenic claudication     Obesity     Psoriasis     Skin disorder     Spinal stenosis of lumbar region     Scheduled for surgery with Dr. Harris on 8/4/2025, decompression and fusion    Spondylosis with myelopathy, lumbar region     Type 2 diabetes mellitus     Vision loss     glasses   [2]   Past Surgical History:  Procedure Laterality Date    COLONOSCOPY  2014    OTHER SURGICAL HISTORY  2019    Callus removal    SHOULDER SURGERY Left 2010   [3]   Family History  Problem Relation Name Age of Onset    Stroke Father      Hypertension Father      Hyperlipidemia Sister      Diabetes Sister      Hyperlipidemia Brother      Diabetes Brother     [4] No Known Allergies

## 2025-07-20 NOTE — H&P (VIEW-ONLY)
CPM/PAT Evaluation       Name: Jared Cox (Jared Cox)  /Age: 1960/64 y.o.     Visit Type:   In-Person       Chief Complaint: Perioperative visit     HPI 63 y/o male scheduled for T11-L1 transpedicular decompression; T10-L2 posterior fusion on 25 secondary to Spondylosis with myelopathy, thoracic region, Spinal stenosis of lumbar region, unspecified whether neurogenic claudication present,   Neurogenic claudication, Spondylolisthesis at L5-S1 level with Dr. Mic Harris who referred to CPM.  Presents to CPM today for perioperative risk stratification and optimization. PMHX includes anxiety, chronic pain, CKD, GERD, hiatal hernia, HLD, HTN, insomnia, obesity and DM2.       Medical History[1]    Surgical History[2]    Patient Sexual activity questions deferred to the physician.    Family History[3]    Allergies[4]    Prior to Admission medications   Medication Sig Start Date End Date Taking? Authorizing Provider   atenolol (Tenormin) 25 mg tablet Take 1 tablet (25 mg) by mouth once daily.    Historical Provider, MD   enalapril (Vasotec) 20 mg tablet Take 1 tablet (20 mg) by mouth once daily.    Historical Provider, MD   Jentadueto XR 5-1,000 mg tablet, IR - ER, biphasic 24hr Take 1 tablet by mouth once daily. 25   Historical Provider, MD   ramelteon (Rozerem) 8 mg tablet Take 1 tablet (8 mg) by mouth once daily at bedtime. 25  Historical Provider, MD   tadalafil 20 mg tablet Take 1 tablet (20 mg) by mouth once daily as needed. 25   Historical Provider, MD   testosterone cypionate (Depo-Testosterone) 200 mg/mL injection inject 1 milliliter intramuscularly EVERY 10 DAYS 24   Historical Provider, MD   triamcinolone (Kenalog) 0.1 % cream Apply topically 2 times a day as needed. 24   Historical Provider, MD RICH ROS:   Constitutional:   neg    Neuro/Psych:    numbness (bilateral feet)  Eyes:   neg    Ears:   neg    Nose:    sinus congestion (congestion for about 1  "week, starting to improve)  Mouth:   neg    Throat:   neg    Neck:   neg    Cardio:   neg    Respiratory:    cough  Endocrine:   neg    GI:   neg    :   neg    Musculoskeletal:   neg    Hematologic:   neg    Skin:  neg        Physical Exam  Vitals reviewed.   Constitutional:       Appearance: Normal appearance. He is obese.   HENT:      Head: Normocephalic.      Nose: Congestion present.      Mouth/Throat:      Pharynx: Oropharynx is clear.     Eyes:      Pupils: Pupils are equal, round, and reactive to light.       Cardiovascular:      Rate and Rhythm: Normal rate and regular rhythm.      Pulses: Normal pulses.      Heart sounds: Normal heart sounds.   Pulmonary:      Effort: Pulmonary effort is normal.      Breath sounds: Normal breath sounds.   Genitourinary:     Comments: Not examined     Musculoskeletal:         General: Normal range of motion.      Cervical back: Normal range of motion.      Comments: No back pain but states his legs will feel heavy - left knee soft brace on     Skin:     General: Skin is warm and dry.     Neurological:      General: No focal deficit present.      Mental Status: He is alert and oriented to person, place, and time.     Psychiatric:         Mood and Affect: Mood normal.         Behavior: Behavior normal.         Thought Content: Thought content normal.         Judgment: Judgment normal.          PAT AIRWAY:   Airway:     Mallampati::  III    TM distance::  >3 FB    Neck ROM::  Full      Visit Vitals  /88   Pulse 63   Temp 36.1 °C (97 °F)   Ht 1.727 m (5' 8\")   Wt 114 kg (252 lb 1.6 oz)   SpO2 95%   BMI 38.33 kg/m²   Smoking Status Never   BSA 2.34 m²       DASI Risk Score      Flowsheet Row Pre-Admission Testing from 7/21/2025 in [facility]   Can you take care of yourself (eat, dress, bathe, or use toilet)?  2.75 filed at 07/21/2025 0804   Can you walk indoors, such as around your house? 1.75 filed at 07/21/2025 0804   Can you walk a block or two on " level ground?  2.75 filed at 07/21/2025 0804   Can you climb a flight of stairs or walk up a hill? 5.5 filed at 07/21/2025 0804   Can you run a short distance? 8 filed at 07/21/2025 0804   Can you do light work around the house like dusting or washing dishes? 2.7 filed at 07/21/2025 0804   Can you do moderate work around the house like vacuuming, sweeping floors or carrying groceries? 3.5 filed at 07/21/2025 0804   Can you do heavy work around the house like scrubbing floors or lifting and moving heavy furniture?  8 filed at 07/21/2025 0804   Can you do yard work like raking leaves, weeding or pushing a mower? 4.5 filed at 07/21/2025 0804   Can you have sexual relations? 5.25 filed at 07/21/2025 0804   Can you participate in moderate recreational activities like golf, bowling, dancing, doubles tennis or throwing a baseball or football? 6 filed at 07/21/2025 0804   Can you participate in strenous sports like swimming, singles tennis, football, basketball, or skiing? 0 filed at 07/21/2025 0804   DASI SCORE 50.7 filed at 07/21/2025 0804   METS Score (Will be calculated only when all the questions are answered) 9 filed at 07/21/2025 0804     Caprini DVT Assessment      Flowsheet Row Pre-Admission Testing from 7/21/2025 in [facility]   DVT Score (IF A SCORE IS NOT CALCULATING, MUST SELECT A BMI TO COMPLETE) 10 filed at 07/21/2025 0841   Surgical Factors Major surgery planned, lasting over 3 hours filed at 07/21/2025 0841   BMI (BMI MUST BE CHOSEN) 31-40 (Obesity) filed at 07/21/2025 0841     Modified Frailty Index      Flowsheet Row Pre-Admission Testing from 7/21/2025 in [facility]   Non-independent functional status (problems with dressing, bathing, personal grooming, or cooking) 0 filed at 07/21/2025 0842   History of diabetes mellitus  0.0909 filed at 07/21/2025 0842   History of COPD 0 filed at 07/21/2025 0842   History of CHF No filed at 07/21/2025 0842   History of MI 0 filed at  07/21/2025 0842   History of Percutaneous Coronary Intervention, Cardiac Surgery, or Angina No filed at 07/21/2025 0842   Hypertension requiring the use of medication  0.0909 filed at 07/21/2025 0842   Peripheral vascular disease 0 filed at 07/21/2025 0842   Impaired sensorium (cognitive impairement or loss, clouding, or delirium) 0 filed at 07/21/2025 0842   TIA or CVA withouy residual deficit 0 filed at 07/21/2025 0842   Cerebrovascular accident with deficit 0 filed at 07/21/2025 0842   Modified Frailty Index Calculator .1818 filed at 07/21/2025 0842     [id number] Stroke Risk Points         N/A 0 to 9 Points:      Last Change: N/A          The [id number] risk score (Lip LISANDRA, et al. 2009. © 2010 [employer]) quantifies the risk of stroke for a patient with atrial fibrillation. For patients without atrial fibrillation or under the age of 18 this score appears as N/A. Higher score values generally indicate higher risk of stroke.        This score is not applicable to this patient. Components are not calculated.          Revised Cardiac Risk Index      Flowsheet Row Pre-Admission Testing from 7/21/2025 in [facility]   High-Risk Surgery (Intraperitoneal, Intrathoracic,Suprainguinal vascular) 1 filed at 07/21/2025 0841   History of ischemic heart disease (History of MI, History of positive exercuse test, Current chest paint considered due to myocardial ischemia, Use of nitrate therapy, ECG with pathological Q Waves) 0 filed at 07/21/2025 0841   History of congestive heart failure (pulmonary edemia, bilateral rales or S3 gallop, Paroxysmal nocturnal dyspnea, CXR showing pulmonary vascular redistribution) 0 filed at 07/21/2025 0841   History of cerebrovascular disease (Prior TIA or stroke) 0 filed at 07/21/2025 0841   Pre-operative insulin treatment 0 filed at 07/21/2025 0841   Pre-operative creatinine>2 mg/dl 0 filed at 07/21/2025 0841   Revised Cardiac Risk Calculator 1  filed at 07/21/2025 0841     Apfel Simplified Score      Flowsheet Row Pre-Admission Testing from 7/21/2025 in [facility]   Smoking status 1 filed at 07/21/2025 0842   History of motion sickness or PONV  0 filed at 07/21/2025 0842   Use of postoperative opioids 1 filed at 07/21/2025 0842   Gender - Female 0=No filed at 07/21/2025 0842   Apfel Simplified Score Calculator 2 filed at 07/21/2025 0842     Risk Analysis Index Results This Encounter    No data found in the last 10 encounters.       Stop Bang Score      Flowsheet Row Pre-Admission Testing from 7/21/2025 in [facility]   Do you snore loudly? 0 filed at 07/21/2025 0804   Do you often feel tired or fatigued after your sleep? 0 filed at 07/21/2025 0804   Has anyone ever observed you stop breathing in your sleep? 0 filed at 07/21/2025 0804   Do you have or are you being treated for high blood pressure? 1 filed at 07/21/2025 0804   Recent BMI (Calculated) 39 filed at 07/21/2025 0804   Is BMI greater than 35 kg/m2? 1=Yes filed at 07/21/2025 0804   Age older than 50 years old? 1=Yes filed at 07/21/2025 0804   Is your neck circumference greater than 17 inches (Male) or 16 inches (Female)? 1 filed at 07/21/2025 0804   Gender - Male 1=Yes filed at 07/21/2025 0804   STOP-BANG Total Score 5 filed at 07/21/2025 0804     Prodigy: High Risk  Total Score: 16              Prodigy Age Score      Prodigy Gender Score          ARISCAT Score for Postoperative Pulmonary Complications      Flowsheet Row Pre-Admission Testing from 7/21/2025 in [facility]   Age Calculated Score 3 filed at 07/21/2025 0842   Preoperative SpO2 8 filed at 07/21/2025 0842   Respiratory infection in the last month Either upper or lower (i.e., URI, bronchitis, pneumonia), with fever and antibiotic treatment 0 filed at 07/21/2025 0842   Preoperative anemia (Hgb less than 10 g/dl) 0 filed at 07/21/2025 0842   Surgical incision  0 filed at 07/21/2025 0886    Duration of surgery  23 filed at 07/21/2025 0842   Emergency Procedure  0 filed at 07/21/2025 0842   ARISCAT Total Score  34 filed at 07/21/2025 0842     Porter Perioperative Risk for Myocardial Infarction or Cardiac Arrest (HAZEL)    No data to display      PROVIDERS/SPECIALIST:  PCP: Chu Lucas DO ([facility])  Neurosurgery: Mic Harris MD    Assessment and Plan:     Anesthesia  The patient denies problems with anesthesia in the past such as PONV, prolonged sedation, awareness, dental damage, aspiration, cardiac arrest, difficult intubation, or unexpected hospital admissions.     Airway  No documented or reported history of airway difficulty.     Neurology  #Insomnia  - Medicated on Ramelteon (continue)    The patient is at increased risk for postoperative delirium secondary to age 65 or older. The patient is at increased risk for perioperative stroke secondary to hypertension , increased age, general anesthesia, operative time >2.5 hours. Handouts for preoperative brain exercises given to patient.    HEENT  #Sinus congestion, started 7/17 - has not required any ATB use, no fever or chills on today's exam. No signs or symptoms of infection, instructed patient if congestion gets worse and you require an ATB contact the surgeons office right away to let them know.     Cardiovascular  #HTN  - Medicated on enalapril (hold 24 hr prior to surgery)    #HLD  - Taking OTC supplement Krill oil (hold on 7/28)    BP: 142/88  EKG 7/11/25: (From [facility], uploaded into chart)  Normal sinus rhythm    Normal ECG    Cardiology Evaluation  The patient is not followed by cardiology.    METS  The patient's functional capacity is greater than 4 METS.  RCRI  The patient meets 1 RCRI criteria and therefore has a 6% risk of major adverse cardiac complications.  HAZEL score which indicates a 0.1% risk of intraoperative or 30-day postoperative MACE (major adverse cardiac event).    Patient is scheduled for non-cardiac surgery associated  with elevated risk. The patient has no major cardiac contraindications to non- cardiac surgery.    Pulmonary  #H/o asthma 30 years ago per patient he thinks it was environmental, not requiring use of inhalers currently     No significant findings on chart review or clinical presentation and evaluation. The patient is at increased risk of perioperative pulmonary complications secondary to advanced age greater than 60.    STOP BANG 5, which places patient at high risk for having CURT.  ARISCAT 34, Intermediate, 13.3% risk of in-hospital postoperative pulmonary complications  PRODIGY 21, high risk of respiratory depression episode.     Patient given PI sheet for preoperative deep breathing exercises.  Encourage  incentive spirometry in the postoperative period as deemed necessary.    Endocrine  #DM2 - Last A1C: 7.2% as of 7/14/25  - Medicated on Jentadueto XR (hold 24 hr prior to surgery)    #Low testosterone  - on supplements    Gastrointestinal  #Hiatal hernia - not causing any symptoms or pain    #GERD  - Takes OTC Prilosec as needed     Eat 10- 2,  self-perceived oropharyngeal dysphagia scale (0-40)     Genitourinary  No diagnoses or significant findings on chart review or clinical presentation and evaluation.    Renal  #CKD stage 3a, managed by PCP - Last BUN 31 and CRE 1.07 on 7/14/25    The patient has a history of chronic kidney disease most consistent with stage 3a.  Patient's renal function appears unchanged when compared to prior labs. The patient has specific risk factors associated with increased risk of perioperative renal complications related to age greater than 55, male gender, hypertension, diabetes mellitus, CKD. Preventative measures include preoperative hydration.    Hematology  No diagnoses or significant findings on chart review or clinical presentation and evaluation.    Caprini score 10, high risk of perioperative VTE.     Patient instructed to ambulate as soon as possible postoperatively to  "decrease thromboembolic risk. Initiate mechanical DVT prophylaxis as soon as possible and initiate chemical prophylaxis when deemed safe from a bleeding standpoint post surgery.     Transfusion Evaluation  A type and screen was obtained given the likelihood for perioperative transfusion of blood or blood products.    Musculoskeletal  #Spondylosis with myelopathy, thoracic region,   Spinal stenosis of lumbar region, Neurogenic claudication, Spondylolisthesis at L5-S1 level  - Patient has seen Dr. Harris with neurosurgery on 5/22/25, per note \"follow up with chronic progressive balance/gait dysfunction, I told him that surgery would likely entail T11-L1 laminectomies and a two-level transpedicular approach for decompression, followed by a T10-L2 posterior fusion\".    ID  No diagnoses or significant findings on chart review or clinical presentation and evaluation. MRSA screening obtained. Prescriptions and instructions given for Hibiclens and Peridex.    Diagnostic Results      - CXR pending on 7/21/25    CT Thoracic Spine: 6/8/2025:  T11/T12: Severe central canal stenosis.   Severe multilevel degenerative changes with hypertrophic ankylosis.     CT Lumbar Spine: 6/8/2025:  Severe multilevel degenerative changes.   L4/L5: Moderate central canal stenosis.   L5/S1: Solid osseous fusion. Spondylolysis with 2 anterolisthesis with severe right and severe left foraminal stenosis.     MR Cervical Spine/Thoracic Spine: 4/2/2025:  There is a dextrocurvature of the thoracic spine. There is mild exaggeration of the kyphotic curvature of the thoracic spine. There are minimal chronic anterior compressive changes involving the T11, T10, and T9 vertebrae. There is multilevel spondylosis within  the thoracic and lumbar region as described above the most pronounced spinal canal narrowing is noted at the T11/12 and T12/L1 levels as noted above.    Recent Results (from the past 4 weeks)   Basic Metabolic Panel    Collection Time: " 07/21/25  8:33 AM   Result Value Ref Range    Glucose 163 (H) 74 - 99 mg/dL    Sodium 135 (L) 136 - 145 mmol/L    Potassium 4.8 3.5 - 5.3 mmol/L    Chloride 104 98 - 107 mmol/L    Bicarbonate 22 21 - 32 mmol/L    Anion Gap 14 10 - 20 mmol/L    Urea Nitrogen 19 6 - 23 mg/dL    Creatinine 1.04 0.50 - 1.30 mg/dL    eGFR 80 >60 mL/min/1.73m*2    Calcium 8.8 8.6 - 10.6 mg/dL   Prealbumin    Collection Time: 07/21/25  8:33 AM   Result Value Ref Range    Prealbumin 28.7 18.0 - 40.0 mg/dL   CBC and Auto Differential    Collection Time: 07/21/25  8:33 AM   Result Value Ref Range    WBC 5.9 4.4 - 11.3 x10*3/uL    nRBC 0.0 0.0 - 0.0 /100 WBCs    RBC 6.11 (H) 4.50 - 5.90 x10*6/uL    Hemoglobin 17.6 (H) 13.5 - 17.5 g/dL    Hematocrit 53.4 (H) 41.0 - 52.0 %    MCV 87 80 - 100 fL    MCH 28.8 26.0 - 34.0 pg    MCHC 33.0 32.0 - 36.0 g/dL    RDW 15.2 (H) 11.5 - 14.5 %    Platelets 170 150 - 450 x10*3/uL    Neutrophils % 54.5 40.0 - 80.0 %    Immature Granulocytes %, Automated 0.3 0.0 - 0.9 %    Lymphocytes % 29.7 13.0 - 44.0 %    Monocytes % 10.2 2.0 - 10.0 %    Eosinophils % 4.6 0.0 - 6.0 %    Basophils % 0.7 0.0 - 2.0 %    Neutrophils Absolute 3.21 1.20 - 7.70 x10*3/uL    Immature Granulocytes Absolute, Automated 0.02 0.00 - 0.70 x10*3/uL    Lymphocytes Absolute 1.75 1.20 - 4.80 x10*3/uL    Monocytes Absolute 0.60 0.10 - 1.00 x10*3/uL    Eosinophils Absolute 0.27 0.00 - 0.70 x10*3/uL    Basophils Absolute 0.04 0.00 - 0.10 x10*3/uL   Staphylococcus aureus/MRSA colonization, Culture    Collection Time: 07/21/25  8:33 AM    Specimen: Anterior Nares; Swab   Result Value Ref Range    Staph/MRSA Screen Culture (A)      Isolated: Methicillin Susceptible Staphylococcus aureus (MSSA)   Type And Screen Is this order related to pregnancy or an upcoming surgery? Yes; Where will this surgery/delivery be performed? [facility] (formerly [facility] What is the date of the surgery? 8/4/2025; Has this patient ever had a transfusion? No; Has th...     Collection Time: 07/21/25  8:33 AM   Result Value Ref Range    ABO TYPE A     Rh TYPE POS     ANTIBODY SCREEN NEG       - Labs collected today: BMP, Prealbumin, CBC w/ diff, MRSA and T/s   - Labs and diagnostic testing reviewed on 7/21/25  - MSSA positive     -Preoperative medication instructions were provided and reviewed with the patient.  Any additional testing or evaluation was explained to the patient.  NPO Instructions were discussed, and the patient's questions were answered prior to conclusion of this encounter. Patient verbalized understanding of preoperative instructions. After Visit Summary given.                     [1]   Past Medical History:  Diagnosis Date    Chronic pain disorder     CKD (chronic kidney disease)     Stage 3a    GERD (gastroesophageal reflux disease)     Hiatal hernia     Hyperlipidemia     Hypertension     Insomnia     Low testosterone     Myalgia     Neurogenic claudication     Obesity     Psoriasis     Skin disorder     Spinal stenosis of lumbar region     Scheduled for surgery with Dr. Harris on 8/4/2025, decompression and fusion    Spondylosis with myelopathy, lumbar region     Type 2 diabetes mellitus     Vision loss     glasses   [2]   Past Surgical History:  Procedure Laterality Date    COLONOSCOPY  2014    OTHER SURGICAL HISTORY  2019    Callus removal    SHOULDER SURGERY Left 2010   [3]   Family History  Problem Relation Name Age of Onset    Stroke Father      Hypertension Father      Hyperlipidemia Sister      Diabetes Sister      Hyperlipidemia Brother      Diabetes Brother     [4] No Known Allergies

## 2025-07-21 ENCOUNTER — HOSPITAL ENCOUNTER (OUTPATIENT)
Dept: RADIOLOGY | Facility: HOSPITAL | Age: 65
Discharge: HOME | End: 2025-07-21
Payer: COMMERCIAL

## 2025-07-21 ENCOUNTER — PRE-ADMISSION TESTING (OUTPATIENT)
Dept: PREADMISSION TESTING | Facility: HOSPITAL | Age: 65
End: 2025-07-21
Payer: COMMERCIAL

## 2025-07-21 VITALS
HEIGHT: 68 IN | WEIGHT: 252.1 LBS | BODY MASS INDEX: 38.21 KG/M2 | TEMPERATURE: 97 F | OXYGEN SATURATION: 95 % | DIASTOLIC BLOOD PRESSURE: 88 MMHG | HEART RATE: 63 BPM | SYSTOLIC BLOOD PRESSURE: 142 MMHG

## 2025-07-21 DIAGNOSIS — R29.818 NEUROGENIC CLAUDICATION: ICD-10-CM

## 2025-07-21 DIAGNOSIS — M47.14 SPONDYLOSIS WITH MYELOPATHY, THORACIC REGION: ICD-10-CM

## 2025-07-21 DIAGNOSIS — Z01.818 PREOPERATIVE TESTING: ICD-10-CM

## 2025-07-21 DIAGNOSIS — M48.061 SPINAL STENOSIS OF LUMBAR REGION, UNSPECIFIED WHETHER NEUROGENIC CLAUDICATION PRESENT: ICD-10-CM

## 2025-07-21 DIAGNOSIS — M43.17 SPONDYLOLISTHESIS AT L5-S1 LEVEL: ICD-10-CM

## 2025-07-21 DIAGNOSIS — M43.17 SPONDYLOLISTHESIS AT L5-S1 LEVEL: Primary | ICD-10-CM

## 2025-07-21 LAB
ABO GROUP (TYPE) IN BLOOD: NORMAL
ANION GAP SERPL CALC-SCNC: 14 MMOL/L (ref 10–20)
ANTIBODY SCREEN: NORMAL
BASOPHILS # BLD AUTO: 0.04 X10*3/UL (ref 0–0.1)
BASOPHILS NFR BLD AUTO: 0.7 %
BUN SERPL-MCNC: 19 MG/DL (ref 6–23)
CALCIUM SERPL-MCNC: 8.8 MG/DL (ref 8.6–10.6)
CHLORIDE SERPL-SCNC: 104 MMOL/L (ref 98–107)
CO2 SERPL-SCNC: 22 MMOL/L (ref 21–32)
CREAT SERPL-MCNC: 1.04 MG/DL (ref 0.5–1.3)
EGFRCR SERPLBLD CKD-EPI 2021: 80 ML/MIN/1.73M*2
EOSINOPHIL # BLD AUTO: 0.27 X10*3/UL (ref 0–0.7)
EOSINOPHIL NFR BLD AUTO: 4.6 %
ERYTHROCYTE [DISTWIDTH] IN BLOOD BY AUTOMATED COUNT: 15.2 % (ref 11.5–14.5)
GLUCOSE SERPL-MCNC: 163 MG/DL (ref 74–99)
HCT VFR BLD AUTO: 53.4 % (ref 41–52)
HGB BLD-MCNC: 17.6 G/DL (ref 13.5–17.5)
IMM GRANULOCYTES # BLD AUTO: 0.02 X10*3/UL (ref 0–0.7)
IMM GRANULOCYTES NFR BLD AUTO: 0.3 % (ref 0–0.9)
LYMPHOCYTES # BLD AUTO: 1.75 X10*3/UL (ref 1.2–4.8)
LYMPHOCYTES NFR BLD AUTO: 29.7 %
MCH RBC QN AUTO: 28.8 PG (ref 26–34)
MCHC RBC AUTO-ENTMCNC: 33 G/DL (ref 32–36)
MCV RBC AUTO: 87 FL (ref 80–100)
MONOCYTES # BLD AUTO: 0.6 X10*3/UL (ref 0.1–1)
MONOCYTES NFR BLD AUTO: 10.2 %
NEUTROPHILS # BLD AUTO: 3.21 X10*3/UL (ref 1.2–7.7)
NEUTROPHILS NFR BLD AUTO: 54.5 %
NRBC BLD-RTO: 0 /100 WBCS (ref 0–0)
PLATELET # BLD AUTO: 170 X10*3/UL (ref 150–450)
POTASSIUM SERPL-SCNC: 4.8 MMOL/L (ref 3.5–5.3)
PREALB SERPL-MCNC: 28.7 MG/DL (ref 18–40)
RBC # BLD AUTO: 6.11 X10*6/UL (ref 4.5–5.9)
RH FACTOR (ANTIGEN D): NORMAL
SODIUM SERPL-SCNC: 135 MMOL/L (ref 136–145)
WBC # BLD AUTO: 5.9 X10*3/UL (ref 4.4–11.3)

## 2025-07-21 PROCEDURE — 99204 OFFICE O/P NEW MOD 45 MIN: CPT

## 2025-07-21 PROCEDURE — 71046 X-RAY EXAM CHEST 2 VIEWS: CPT | Performed by: RADIOLOGY

## 2025-07-21 PROCEDURE — 80048 BASIC METABOLIC PNL TOTAL CA: CPT | Performed by: NEUROLOGICAL SURGERY

## 2025-07-21 PROCEDURE — 36415 COLL VENOUS BLD VENIPUNCTURE: CPT

## 2025-07-21 PROCEDURE — 84134 ASSAY OF PREALBUMIN: CPT | Performed by: NEUROLOGICAL SURGERY

## 2025-07-21 PROCEDURE — 71046 X-RAY EXAM CHEST 2 VIEWS: CPT

## 2025-07-21 PROCEDURE — 87081 CULTURE SCREEN ONLY: CPT | Performed by: NEUROLOGICAL SURGERY

## 2025-07-21 PROCEDURE — 86850 RBC ANTIBODY SCREEN: CPT

## 2025-07-21 PROCEDURE — 85025 COMPLETE CBC W/AUTO DIFF WBC: CPT | Performed by: NEUROLOGICAL SURGERY

## 2025-07-21 RX ORDER — ASCORBIC ACID 250 MG
250 TABLET ORAL DAILY
COMMUNITY

## 2025-07-21 RX ORDER — CHLORHEXIDINE GLUCONATE 40 MG/ML
SOLUTION TOPICAL
Qty: 473 ML | Refills: 0 | Status: SHIPPED | OUTPATIENT
Start: 2025-07-21

## 2025-07-21 RX ORDER — BISMUTH SUBSALICYLATE 262 MG
1 TABLET,CHEWABLE ORAL DAILY
COMMUNITY

## 2025-07-21 RX ORDER — PNV NO.95/FERROUS FUM/FOLIC AC 28MG-0.8MG
100 TABLET ORAL DAILY
COMMUNITY

## 2025-07-21 RX ORDER — ST. JOHN'S WORT 300 MG
1 CAPSULE ORAL DAILY
COMMUNITY

## 2025-07-21 RX ORDER — CHLORHEXIDINE GLUCONATE ORAL RINSE 1.2 MG/ML
SOLUTION DENTAL
Qty: 120 ML | Refills: 0 | Status: SHIPPED | OUTPATIENT
Start: 2025-07-21

## 2025-07-21 RX ORDER — CHOLECALCIFEROL (VITAMIN D3) 25 MCG
25 TABLET ORAL DAILY
COMMUNITY

## 2025-07-21 ASSESSMENT — DUKE ACTIVITY SCORE INDEX (DASI)
CAN YOU DO LIGHT WORK AROUND THE HOUSE LIKE DUSTING OR WASHING DISHES: YES
CAN YOU WALK INDOORS, SUCH AS AROUND YOUR HOUSE: YES
CAN YOU CLIMB A FLIGHT OF STAIRS OR WALK UP A HILL: YES
CAN YOU PARTICIPATE IN MODERATE RECREATIONAL ACTIVITIES LIKE GOLF, BOWLING, DANCING, DOUBLES TENNIS OR THROWING A BASEBALL OR FOOTBALL: YES
CAN YOU DO MODERATE WORK AROUND THE HOUSE LIKE VACUUMING, SWEEPING FLOORS OR CARRYING GROCERIES: YES
CAN YOU PARTICIPATE IN STRENOUS SPORTS LIKE SWIMMING, SINGLES TENNIS, FOOTBALL, BASKETBALL, OR SKIING: NO
TOTAL_SCORE: 50.7
CAN YOU HAVE SEXUAL RELATIONS: YES
CAN YOU RUN A SHORT DISTANCE: YES
CAN YOU DO YARD WORK LIKE RAKING LEAVES, WEEDING OR PUSHING A MOWER: YES
DASI METS SCORE: 9
CAN YOU DO HEAVY WORK AROUND THE HOUSE LIKE SCRUBBING FLOORS OR LIFTING AND MOVING HEAVY FURNITURE: YES
CAN YOU WALK A BLOCK OR TWO ON LEVEL GROUND: YES
CAN YOU TAKE CARE OF YOURSELF (EAT, DRESS, BATHE, OR USE TOILET): YES

## 2025-07-21 ASSESSMENT — ENCOUNTER SYMPTOMS
NUMBNESS: 1
GASTROINTESTINAL NEGATIVE: 1
SINUS CONGESTION: 1
MUSCULOSKELETAL NEGATIVE: 1
CONSTITUTIONAL NEGATIVE: 1
EYES NEGATIVE: 1
COUGH: 1
ENDOCRINE NEGATIVE: 1
NECK NEGATIVE: 1
CARDIOVASCULAR NEGATIVE: 1

## 2025-07-21 ASSESSMENT — LIFESTYLE VARIABLES: SMOKING_STATUS: NONSMOKER

## 2025-07-21 NOTE — NURSING NOTE
Patient seen in PAT. Orders reviewed with PAT Provider.     Following labs obtained by documenting RN: T/S, MRSA, CBC, PreAlbumin, BMP    EKG: Previously done 7/25    Patient discharged with: Pre-procedure instructions/AVS      Oksana VALENCIAN, RN  [facility]   [facility]

## 2025-07-21 NOTE — PREPROCEDURE INSTRUCTIONS
Thank you for visiting [facility] ([facility]) today for your pre-procedure evaluation, you were seen by     MK Mata  Department of Anesthesiology and Perioperative Medicine  Main phone [phone]  Fax [phone]    This summary includes instructions and information to aid you during your perioperative period.  Please read carefully. If you have any questions about your visit today, please call the number listed above.  If you become ill or have any changes to your health before your surgery, please contact your primary care provider and alert your surgeon.    General Medications Instructions (see back for further medication instructions)  Hold Vit D supplementation day of surgery  Hold all other vitamins and supplements 7 days prior to surgery  Tylenol okay to continue, please hold Aleve/naproxen/ibuprofen (NSAIDs) for 7 days prior to surgery  No lotion/moisturizers or Deoderant after last shower prior to surgery    You will be called business day prior to surgery to confirm arrival time.     Preparing for Surgery       Preoperative Fasting Guidelines  Why must I stop eating and drinking near surgery time?  With sedation, food or liquid in your stomach can enter your lungs causing serious complications  Food can increase nausea and vomiting  When do I need to stop eating and drinking before my surgery?  Do not eat any food after midnight the night before your surgery/procedure.  Do not eat any food or drink any liquids after midnight the night before your surgery/procedure.  You may have sips of water to take medications.     Tobacco and Alcohol;  Do not drink alcohol or smoke within 24 hours of surgery.  It is best to quit smoking for as long as possible before any surgery or procedure.       Other Instructions  Why did I have my nose, under my arms, and groin swabbed? The purpose of the swab is to identify Staphylococcus aureus inside your nose or on your skin.  The  "swab was sent to the laboratory for culture.  A positive swab/culture for Staphylococcus aureus is called colonization or carriage.   What is Staphylococcus aureus? Staphylococcus aureus, also known as \"staph\", is a germ found on the skin or in the nose of healthy people.  Sometimes Staphylococcus aureus can get into the body and cause an infection.  This can be minor (such as pimples, boils, or other skin problems).  It might also be serious (such as a blood infection, pneumonia, or a surgical site infection). What is Staphylococcus aureus colonization or carriage? Colonization or carriage means that a person has the germ but is not sick from it.  These bacteria can be spread on the hands or when breathing or sneezing. How is Staphylococcus aureus spread? It is most often spread by close contact with a person or item that carries it. What happens if my culture is positive for Staphylococcus aureus? Your doctor/medical team will use this information to guide any antibiotic treatment which may be necessary.  Regardless of the culture results, we will clean the inside of your nose with a betadine swab just before you have your surgery. Will I get an infection if I have Staphylococcus aureus in my nose or on my skin? Anyone can get an infection with Staphylococcus aureus.  However, the best way to reduce your risk of infection is to follow the instructions provided to you for the use of your CHG soap and dental rinse.  , Body Wash; What is a home preoperative antibacterial shower? This shower is a way of cleaning the skin with a germ-killing solution before surgery.  The solution contains chlorhexidine, commonly known as CHG.  CHG is a skin cleanser with germ-killing ability.  Let your doctor know if you are allergic to chlorhexidine. Why do I need to take a preoperative antibacterial shower? Skin is not sterile.  It is best to try to make your skin as free of germs as possible before surgery.  Proper cleansing with a " germ-killing soap before surgery can lower the number of germs on your skin.  This helps to reduce the risk of infection at the surgical site.  Following the instructions listed below will help you prepare your skin for surgery.   How do I use the solution? Steps:  Begin using your CHG soap 5 days before your scheduled surgery on ___________.   First, wash and rinse your hair using the CHG soap. Keep CHG soap away from ear canals and eyes.  Rinse completely, do not condition.  Hair extensions should be removed. , Oral/Dental Rinse: What is oral/dental rinse?  It is a mouthwash. It is a way of cleaning the mouth with a germ-killing solution before your surgery.  The solution contains chlorhexidine, commonly known as CHG. It is used inside the mouth to kill a bacteria known as Staphylococcus aureus.  Let your doctor know if you are allergic to Chlorhexidine. Why do I need to use CHG oral/dental rinse? The CHG oral/dental rinse helps to kill a bacteria in your mouth known as Staphylococcus aureus.  This reduces the risk of infection at the surgical site.  Using your CHG oral/dental rinse STEPS: Use your CHG oral/dental rinse after you brush your teeth the night before (at bedtime) and the morning of your surgery.  Follow all directions on your prescription label.  Use the cap on the container to measure 15 ml.  Swish (gargle if you can) the mouthwash in your mouth for at least 30 seconds, (do not swallow) and spit out.  After you use your CHG rinse, do not rinse your mouth with water, drink or eat.  Please refer to the prescription label for the appropriate time to resume oral intake What side effects might I have using the CHG oral/dental rinse? CHG rinse will stick to plaque on the teeth.  Brush and floss just before use.  Teeth brushing will help avoid staining of plaque during use.       The Week before Surgery        Seven days before Surgery  Check your CPM medication instructions  Do the exercises provided to  you by [facility]   Arrange for a responsible, adult licensed  to take you home after surgery and stay with you for 24 hours.  You will not be permitted to drive yourself home if you have received any anesthetic/sedation  Five days before surgery  Check your CPM medication instructions  Do the exercises provided to you by CPM   Start using Chlorhexidene (CHG) body wash if prescribed (Continue till day of surgery)      The Day before Surgery       Check your CPM medication and all other CPM instructions including when to stop eating and drinking  You will be called with your arrival time for surgery in the late afternoon.  If you do not receive a call please reach out to your surgeon's office.  Do not smoke or drink 24 hours before surgery  Prepare items to bring with you to the hospital  Shower with your chlorhexidine wash if prescribed  Brush your teeth and use your chlorhexidine dental rinse if prescribed    The Day of Surgery       Check your CPM medication instructions  Shower, if prescribed use CHG.  Do not apply any lotions, creams, moisturizers, perfume or deodorant  Brush your teeth and use your CHG dental rinse if prescribed  Wear loose comfortable clothing  Avoid make-up  Remove  jewelry and piercings, consider professional piercing removal with a plastic spacer if needed  Bring photo ID and Insurance card  Bring an accurate medication list that includes medication dose, frequency and allergies  Bring a copy of your advanced directives (will, health care power of )  Bring any devices and controllers as well as medical devices you have been provided with for surgery (CPAP, slings, braces, etc.)  Dentures, eyeglasses, and contacts will be removed before surgery, please bring cases for contacts or glasses    Preoperative Deep Breathing Exercises    Why it is important to do deep breathing exercises before my surgery?  Deep breathing exercises strengthen your breathing muscles.  This helps you to  recover after your surgery and decreases the chance of breathing complications.    How are the deep breathing exercises done?  Sit straight with your back supported.  Breathe in deeply and slowly through your nose. Your lower rib cage should expand and your abdomen may move forward.  Hold that breath for 3 to 5 seconds.  Breathe out through pursed lips, slowly and completely.  Rest and repeat 10 times every hour while awake.  Rest longer if you become dizzy or lightheaded.      Preoperative Brain Exercises    What are brain exercises?  A brain exercise is any activity that engages your thinking (cognitive) skills.    What types of activities are considered brain exercises?  Jigsaw puzzles, crossword puzzles, word jumble, memory games, word search, and many more.  Many can be found free online or on your phone via a mobile evy.    Why should I do brain exercises before my surgery?  More recent research has shown brain exercise before surgery can lower the risk of postoperative delirium (confusion) which can be especially important for older adults.  Patients who did brain exercises for 5 to 10 hours the days before surgery, cut their risk of postoperative delirium in half up to 1 week after surgery.    Sit-to-Stand Exercise    What is the sit-to-stand exercise?  The sit-to-stand exercise strengthens the muscles of your lower body and muscles in the center of your body (core muscles for stability) helping to maintain and improve your strength and mobility.  How do I do the sit-to-stand exercise?  The goal is to do this exercise without using your arms or hands.  If this is too difficult, use your arms and hands or a chair with armrests to help slowly push yourself to the standing position and lower yourself back to the sitting position. As the movement becomes easier use your arms and hands less.    Steps to the sit-to-stand exercise  Sit up tall in a sturdy chair, knees bent, feet flat on the floor shoulder-width  apart.  Shift your hips/pelvis forward in the chair to correctly position yourself for the next movement.  Lean forward at your hips.  Stand up straight putting equal weight on both feet.  Check to be sure you are properly aligned with the chair, in a slow controlled movement sit back down.  Repeat this exercise 10-15 times.  If needed you can do it fewer times until your strength improves.  Rest for 1 minute.  Do another 10-15 sit-to-stand exercises.  Try to do this in the morning and evening.       Simple things you can do to help prevent blood clots     Blood clots are blockages that can form in the body's veins. When a blood clot forms in your deep veins, it may be called a deep vein thrombosis, or DVT for short. Blood clots can happen in any part of the body where blood flows, but they are most common in the arms and legs. If a piece of a blood clot breaks free and travels to the lungs, it is called a pulmonary embolus (PE). A PE can be a very serious problem.      Being in the hospital or having surgery can raise your chances of getting a blood clot because you may not be well enough to move around as much as you normally do.         Ways you can help prevent blood clots in the hospital       Wearing SCDs  SCDs stands for Sequential Compression Devices.   SCDs are special sleeves that wrap around your legs. They attach to a pump that fills them with air to gently squeeze your legs every few minutes.  This helps return the blood in your legs to your heart.   SCDs should only be taken off when walking or bathing. SCDs may not be comfortable, but they can help save your life.              Pump SCD leg sleeves  Wearing compression stockings - if your doctor orders them. These special snug-fitting stockings gently squeeze your legs to help blood flow.       Walking. Walking helps move the blood in your legs.   If your doctor says it is ok, try walking the halls at least   5 times a day. Ask us to help you get up,  so you don't fall.      Taking any blood-thinning medicines your doctor orders.              Ways you can help prevent blood clots at home         Wearing compression stockings - if your doctor orders them.   Walking - to help move the blood in your legs.    Taking any blood-thinning medicines your doctor orders.      Signs of a blood clot or PE    Tell your doctor or nurse right away if you have any of the problems listed below.         If you are at home, seek medical care right away. Call 911 for chest pain or problems breathing.            Signs of a blood clot (DVT) - such as pain, swelling, redness, or warmth in your arm or legs.  Signs of a pulmonary embolism (PE) - such as chest pain or feeling short of breath

## 2025-07-22 LAB — STAPHYLOCOCCUS SPEC CULT: ABNORMAL

## 2025-08-01 NOTE — PROGRESS NOTES
Pharmacy Medication History Review    Jared Cox is a 64 y.o. male who is planned to be admitted for No Principal Problem: There is no principal problem currently on the Problem List. Please update the Problem List and refresh.. Pharmacy called the patient prior to their scheduled procedure and reviewed the patient's [id number] medications for accuracy.    Medications ADDED:  none  Medications CHANGED:  none  Medications REMOVED:   none    Please review updated prior to admission medication list and comments regarding how patient may be taking medications differently by going to Admission tab --> Admission Orders --> Admit Orders / Review prior to admission medications.     Preferred pharmacy, last doses of medications, and allergies to be confirmed with patient by nursing the day of procedure.     Sources used to complete the med history include:  [facility]  Pharmacy dispense history  Patient Interview Good historian  Chart Review  Care Everywhere     Below are additional concerns with the patient's PTA list.  Patient states they stopped their supplements in preparation of procedure    Dolly Grier CPhT  Please reach out via Secure Chat for questions

## 2025-08-02 ENCOUNTER — ANESTHESIA EVENT (OUTPATIENT)
Dept: OPERATING ROOM | Facility: HOSPITAL | Age: 65
End: 2025-08-02
Payer: COMMERCIAL

## 2025-08-04 ENCOUNTER — ANESTHESIA (OUTPATIENT)
Dept: OPERATING ROOM | Facility: HOSPITAL | Age: 65
End: 2025-08-04
Payer: COMMERCIAL

## 2025-08-04 ENCOUNTER — HOSPITAL ENCOUNTER (INPATIENT)
Facility: HOSPITAL | Age: 65
LOS: 4 days | Discharge: HOME | End: 2025-08-08
Attending: NEUROLOGICAL SURGERY | Admitting: NEUROLOGICAL SURGERY
Payer: COMMERCIAL

## 2025-08-04 ENCOUNTER — APPOINTMENT (OUTPATIENT)
Dept: RADIOLOGY | Facility: HOSPITAL | Age: 65
End: 2025-08-04
Payer: COMMERCIAL

## 2025-08-04 ENCOUNTER — APPOINTMENT (OUTPATIENT)
Dept: NEUROLOGY | Facility: HOSPITAL | Age: 65
End: 2025-08-04
Payer: COMMERCIAL

## 2025-08-04 DIAGNOSIS — M47.14 SPONDYLOSIS WITH MYELOPATHY, THORACIC REGION: Primary | ICD-10-CM

## 2025-08-04 DIAGNOSIS — M48.062 SPINAL STENOSIS OF LUMBAR REGION WITH NEUROGENIC CLAUDICATION: ICD-10-CM

## 2025-08-04 DIAGNOSIS — K59.03 CONSTIPATION DUE TO PAIN MEDICATION: ICD-10-CM

## 2025-08-04 DIAGNOSIS — G89.18 POSTOPERATIVE PAIN: ICD-10-CM

## 2025-08-04 DIAGNOSIS — M43.17 SPONDYLOLISTHESIS AT L5-S1 LEVEL: ICD-10-CM

## 2025-08-04 DIAGNOSIS — R29.818 NEUROGENIC CLAUDICATION: ICD-10-CM

## 2025-08-04 DIAGNOSIS — M48.061 SPINAL STENOSIS OF LUMBAR REGION, UNSPECIFIED WHETHER NEUROGENIC CLAUDICATION PRESENT: ICD-10-CM

## 2025-08-04 PROBLEM — K44.9 HIATAL HERNIA: Status: ACTIVE | Noted: 2025-08-04

## 2025-08-04 PROBLEM — I10 HTN (HYPERTENSION): Status: ACTIVE | Noted: 2025-08-04

## 2025-08-04 PROBLEM — E11.9 DIABETES MELLITUS, TYPE 2 (MULTI): Status: ACTIVE | Noted: 2025-08-04

## 2025-08-04 LAB
ABO GROUP (TYPE) IN BLOOD: NORMAL
ALBUMIN SERPL BCP-MCNC: 3.6 G/DL (ref 3.4–5)
ANION GAP BLDA CALCULATED.4IONS-SCNC: 10 MMO/L (ref 10–25)
ANION GAP BLDA CALCULATED.4IONS-SCNC: 10 MMO/L (ref 10–25)
ANION GAP BLDA CALCULATED.4IONS-SCNC: 9 MMO/L (ref 10–25)
ANION GAP SERPL CALC-SCNC: 11 MMOL/L (ref 10–20)
BASE EXCESS BLDA CALC-SCNC: -1.5 MMOL/L (ref -2–3)
BASE EXCESS BLDA CALC-SCNC: -3.5 MMOL/L (ref -2–3)
BASE EXCESS BLDA CALC-SCNC: 0.7 MMOL/L (ref -2–3)
BODY TEMPERATURE: 37 DEGREES CELSIUS
BUN SERPL-MCNC: 18 MG/DL (ref 6–23)
CA-I BLDA-SCNC: 1.02 MMOL/L (ref 1.1–1.33)
CA-I BLDA-SCNC: 1.04 MMOL/L (ref 1.1–1.33)
CA-I BLDA-SCNC: 1.19 MMOL/L (ref 1.1–1.33)
CALCIUM SERPL-MCNC: 9.4 MG/DL (ref 8.6–10.6)
CHLORIDE BLDA-SCNC: 104 MMOL/L (ref 98–107)
CHLORIDE BLDA-SCNC: 107 MMOL/L (ref 98–107)
CHLORIDE BLDA-SCNC: 110 MMOL/L (ref 98–107)
CHLORIDE SERPL-SCNC: 111 MMOL/L (ref 98–107)
CO2 SERPL-SCNC: 22 MMOL/L (ref 21–32)
CREAT SERPL-MCNC: 1.08 MG/DL (ref 0.5–1.3)
EGFRCR SERPLBLD CKD-EPI 2021: 76 ML/MIN/1.73M*2
ERYTHROCYTE [DISTWIDTH] IN BLOOD BY AUTOMATED COUNT: 13.9 % (ref 11.5–14.5)
GLUCOSE BLD MANUAL STRIP-MCNC: 121 MG/DL (ref 74–99)
GLUCOSE BLD MANUAL STRIP-MCNC: 134 MG/DL (ref 74–99)
GLUCOSE BLD MANUAL STRIP-MCNC: 196 MG/DL (ref 74–99)
GLUCOSE BLDA-MCNC: 134 MG/DL (ref 74–99)
GLUCOSE BLDA-MCNC: 160 MG/DL (ref 74–99)
GLUCOSE BLDA-MCNC: 192 MG/DL (ref 74–99)
GLUCOSE SERPL-MCNC: 139 MG/DL (ref 74–99)
HCO3 BLDA-SCNC: 22.1 MMOL/L (ref 22–26)
HCO3 BLDA-SCNC: 23.7 MMOL/L (ref 22–26)
HCO3 BLDA-SCNC: 26 MMOL/L (ref 22–26)
HCT VFR BLD AUTO: 34.7 % (ref 41–52)
HCT VFR BLD EST: 35 % (ref 41–52)
HCT VFR BLD EST: 40 % (ref 41–52)
HCT VFR BLD EST: 48 % (ref 41–52)
HGB BLD-MCNC: 11.8 G/DL (ref 13.5–17.5)
HGB BLDA-MCNC: 11.8 G/DL (ref 13.5–17.5)
HGB BLDA-MCNC: 13.2 G/DL (ref 13.5–17.5)
HGB BLDA-MCNC: 16 G/DL (ref 13.5–17.5)
INHALED O2 CONCENTRATION: 21 %
INHALED O2 CONCENTRATION: 50 %
INHALED O2 CONCENTRATION: 50 %
LACTATE BLDA-SCNC: 1.2 MMOL/L (ref 0.4–2)
LACTATE BLDA-SCNC: 1.3 MMOL/L (ref 0.4–2)
LACTATE BLDA-SCNC: 1.6 MMOL/L (ref 0.4–2)
MCH RBC QN AUTO: 29.5 PG (ref 26–34)
MCHC RBC AUTO-ENTMCNC: 34 G/DL (ref 32–36)
MCV RBC AUTO: 87 FL (ref 80–100)
NRBC BLD-RTO: 0 /100 WBCS (ref 0–0)
OXYHGB MFR BLDA: 97.3 % (ref 94–98)
OXYHGB MFR BLDA: 97.8 % (ref 94–98)
OXYHGB MFR BLDA: 98 % (ref 94–98)
PCO2 BLDA: 41 MM HG (ref 38–42)
PCO2 BLDA: 41 MM HG (ref 38–42)
PCO2 BLDA: 43 MM HG (ref 38–42)
PH BLDA: 7.34 PH (ref 7.38–7.42)
PH BLDA: 7.37 PH (ref 7.38–7.42)
PH BLDA: 7.39 PH (ref 7.38–7.42)
PHOSPHATE SERPL-MCNC: 3.6 MG/DL (ref 2.5–4.9)
PLATELET # BLD AUTO: 118 X10*3/UL (ref 150–450)
PO2 BLDA: 159 MM HG (ref 85–95)
PO2 BLDA: 184 MM HG (ref 85–95)
PO2 BLDA: 89 MM HG (ref 85–95)
POTASSIUM BLDA-SCNC: 3.7 MMOL/L (ref 3.5–5.3)
POTASSIUM BLDA-SCNC: 4.1 MMOL/L (ref 3.5–5.3)
POTASSIUM BLDA-SCNC: 4.6 MMOL/L (ref 3.5–5.3)
POTASSIUM SERPL-SCNC: 3.9 MMOL/L (ref 3.5–5.3)
RBC # BLD AUTO: 4 X10*6/UL (ref 4.5–5.9)
RH FACTOR (ANTIGEN D): NORMAL
SAO2 % BLDA: 100 % (ref 94–100)
SAO2 % BLDA: 99 % (ref 94–100)
SAO2 % BLDA: 99 % (ref 94–100)
SODIUM BLDA-SCNC: 134 MMOL/L (ref 136–145)
SODIUM BLDA-SCNC: 137 MMOL/L (ref 136–145)
SODIUM BLDA-SCNC: 138 MMOL/L (ref 136–145)
SODIUM SERPL-SCNC: 140 MMOL/L (ref 136–145)
WBC # BLD AUTO: 9.5 X10*3/UL (ref 4.4–11.3)

## 2025-08-04 PROCEDURE — 22610 ARTHRD PST TQ 1NTRSPC THRC: CPT | Performed by: NEUROLOGICAL SURGERY

## 2025-08-04 PROCEDURE — 2500000004 HC RX 250 GENERAL PHARMACY W/ HCPCS (ALT 636 FOR OP/ED): Mod: JZ,TB | Performed by: ANESTHESIOLOGY

## 2025-08-04 PROCEDURE — 2500000004 HC RX 250 GENERAL PHARMACY W/ HCPCS (ALT 636 FOR OP/ED): Performed by: STUDENT IN AN ORGANIZED HEALTH CARE EDUCATION/TRAINING PROGRAM

## 2025-08-04 PROCEDURE — P9045 ALBUMIN (HUMAN), 5%, 250 ML: HCPCS | Mod: JZ,TB

## 2025-08-04 PROCEDURE — 85027 COMPLETE CBC AUTOMATED: CPT | Performed by: STUDENT IN AN ORGANIZED HEALTH CARE EDUCATION/TRAINING PROGRAM

## 2025-08-04 PROCEDURE — 3700000002 HC GENERAL ANESTHESIA TIME - EACH INCREMENTAL 1 MINUTE: Performed by: NEUROLOGICAL SURGERY

## 2025-08-04 PROCEDURE — 84132 ASSAY OF SERUM POTASSIUM: CPT | Performed by: ANESTHESIOLOGY

## 2025-08-04 PROCEDURE — 85027 COMPLETE CBC AUTOMATED: CPT

## 2025-08-04 PROCEDURE — 0RG6071 FUSION OF THORACIC VERTEBRAL JOINT WITH AUTOLOGOUS TISSUE SUBSTITUTE, POSTERIOR APPROACH, POSTERIOR COLUMN, OPEN APPROACH: ICD-10-PCS | Performed by: NEUROLOGICAL SURGERY

## 2025-08-04 PROCEDURE — 2500000004 HC RX 250 GENERAL PHARMACY W/ HCPCS (ALT 636 FOR OP/ED)

## 2025-08-04 PROCEDURE — 01N80ZZ RELEASE THORACIC NERVE, OPEN APPROACH: ICD-10-PCS | Performed by: NEUROLOGICAL SURGERY

## 2025-08-04 PROCEDURE — 7100000001 HC RECOVERY ROOM TIME - INITIAL BASE CHARGE: Performed by: NEUROLOGICAL SURGERY

## 2025-08-04 PROCEDURE — 37799 UNLISTED PX VASCULAR SURGERY: CPT | Performed by: STUDENT IN AN ORGANIZED HEALTH CARE EDUCATION/TRAINING PROGRAM

## 2025-08-04 PROCEDURE — A22610 PR ARTHRODESIS POSTERIOR/POSTEROLATERAL THORACIC: Performed by: NURSE ANESTHETIST, CERTIFIED REGISTERED

## 2025-08-04 PROCEDURE — 61783 SCAN PROC SPINAL: CPT | Performed by: NEUROLOGICAL SURGERY

## 2025-08-04 PROCEDURE — 2500000001 HC RX 250 WO HCPCS SELF ADMINISTERED DRUGS (ALT 637 FOR MEDICARE OP)

## 2025-08-04 PROCEDURE — A22610 PR ARTHRODESIS POSTERIOR/POSTEROLATERAL THORACIC: Performed by: ANESTHESIOLOGY

## 2025-08-04 PROCEDURE — 3700000001 HC GENERAL ANESTHESIA TIME - INITIAL BASE CHARGE: Performed by: NEUROLOGICAL SURGERY

## 2025-08-04 PROCEDURE — 0RGA071 FUSION OF THORACOLUMBAR VERTEBRAL JOINT WITH AUTOLOGOUS TISSUE SUBSTITUTE, POSTERIOR APPROACH, POSTERIOR COLUMN, OPEN APPROACH: ICD-10-PCS | Performed by: NEUROLOGICAL SURGERY

## 2025-08-04 PROCEDURE — 2500000005 HC RX 250 GENERAL PHARMACY W/O HCPCS: Performed by: NEUROLOGICAL SURGERY

## 2025-08-04 PROCEDURE — 63057 DECOMPRESS SPINE CORD ADD-ON: CPT | Performed by: NEUROLOGICAL SURGERY

## 2025-08-04 PROCEDURE — 22842 INSERT SPINE FIXATION DEVICE: CPT | Performed by: NEUROLOGICAL SURGERY

## 2025-08-04 PROCEDURE — C1713 ANCHOR/SCREW BN/BN,TIS/BN: HCPCS | Performed by: NEUROLOGICAL SURGERY

## 2025-08-04 PROCEDURE — 36415 COLL VENOUS BLD VENIPUNCTURE: CPT

## 2025-08-04 PROCEDURE — 84132 ASSAY OF SERUM POTASSIUM: CPT

## 2025-08-04 PROCEDURE — 82947 ASSAY GLUCOSE BLOOD QUANT: CPT

## 2025-08-04 PROCEDURE — 2500000001 HC RX 250 WO HCPCS SELF ADMINISTERED DRUGS (ALT 637 FOR MEDICARE OP): Performed by: STUDENT IN AN ORGANIZED HEALTH CARE EDUCATION/TRAINING PROGRAM

## 2025-08-04 PROCEDURE — 2720000007 HC OR 272 NO HCPCS: Performed by: NEUROLOGICAL SURGERY

## 2025-08-04 PROCEDURE — 2780000003 HC OR 278 NO HCPCS: Performed by: NEUROLOGICAL SURGERY

## 2025-08-04 PROCEDURE — 01NB0ZZ RELEASE LUMBAR NERVE, OPEN APPROACH: ICD-10-PCS | Performed by: NEUROLOGICAL SURGERY

## 2025-08-04 PROCEDURE — 3600000018 HC OR TIME - INITIAL BASE CHARGE - PROCEDURE LEVEL SIX: Performed by: NEUROLOGICAL SURGERY

## 2025-08-04 PROCEDURE — 2500000004 HC RX 250 GENERAL PHARMACY W/ HCPCS (ALT 636 FOR OP/ED): Performed by: NURSE ANESTHETIST, CERTIFIED REGISTERED

## 2025-08-04 PROCEDURE — 95870 NDL EMG LMTD STD MUSC 1 XTR: CPT

## 2025-08-04 PROCEDURE — 86923 COMPATIBILITY TEST ELECTRIC: CPT

## 2025-08-04 PROCEDURE — 36620 INSERTION CATHETER ARTERY: CPT | Performed by: ANESTHESIOLOGY

## 2025-08-04 PROCEDURE — 3600000017 HC OR TIME - EACH INCREMENTAL 1 MINUTE - PROCEDURE LEVEL SIX: Performed by: NEUROLOGICAL SURGERY

## 2025-08-04 PROCEDURE — 22614 ARTHRD PST TQ 1NTRSPC EA ADD: CPT | Performed by: NEUROLOGICAL SURGERY

## 2025-08-04 PROCEDURE — 0SG0071 FUSION OF LUMBAR VERTEBRAL JOINT WITH AUTOLOGOUS TISSUE SUBSTITUTE, POSTERIOR APPROACH, POSTERIOR COLUMN, OPEN APPROACH: ICD-10-PCS | Performed by: NEUROLOGICAL SURGERY

## 2025-08-04 PROCEDURE — 1100000001 HC PRIVATE ROOM DAILY

## 2025-08-04 PROCEDURE — 7100000002 HC RECOVERY ROOM TIME - EACH INCREMENTAL 1 MINUTE: Performed by: NEUROLOGICAL SURGERY

## 2025-08-04 PROCEDURE — 36415 COLL VENOUS BLD VENIPUNCTURE: CPT | Performed by: ANESTHESIOLOGY

## 2025-08-04 PROCEDURE — 84132 ASSAY OF SERUM POTASSIUM: CPT | Performed by: STUDENT IN AN ORGANIZED HEALTH CARE EDUCATION/TRAINING PROGRAM

## 2025-08-04 PROCEDURE — 63055 DECOMPRESS SPINAL CORD THRC: CPT | Performed by: NEUROLOGICAL SURGERY

## 2025-08-04 PROCEDURE — 63046 LAM FACETEC & FORAMOT THRC: CPT | Performed by: NEUROLOGICAL SURGERY

## 2025-08-04 PROCEDURE — 80069 RENAL FUNCTION PANEL: CPT

## 2025-08-04 PROCEDURE — 2500000004 HC RX 250 GENERAL PHARMACY W/ HCPCS (ALT 636 FOR OP/ED): Performed by: NEUROLOGICAL SURGERY

## 2025-08-04 DEVICE — SCREW, SOLERA 5.5/6.0 ATS, 7.5 X 55: Type: IMPLANTABLE DEVICE | Site: SPINE THORACIC | Status: FUNCTIONAL

## 2025-08-04 DEVICE — IMPLANTABLE DEVICE: Type: IMPLANTABLE DEVICE | Site: SPINE THORACIC | Status: FUNCTIONAL

## 2025-08-04 DEVICE — SET SCREW, 5.5, TI NS BRK OFF: Type: IMPLANTABLE DEVICE | Site: SPINE THORACIC | Status: FUNCTIONAL

## 2025-08-04 DEVICE — SCREW, SOLERA 5.5/6.0 ATS, 7.5 X 50: Type: IMPLANTABLE DEVICE | Site: SPINE THORACIC | Status: FUNCTIONAL

## 2025-08-04 RX ORDER — PHENYLEPHRINE 10 MG/250 ML(40 MCG/ML)IN 0.9 % SOD.CHLORIDE INTRAVENOUS
CONTINUOUS PRN
Status: DISCONTINUED | OUTPATIENT
Start: 2025-08-04 | End: 2025-08-04

## 2025-08-04 RX ORDER — OXYCODONE HYDROCHLORIDE 5 MG/1
2.5 TABLET ORAL EVERY 4 HOURS PRN
Status: DISCONTINUED | OUTPATIENT
Start: 2025-08-04 | End: 2025-08-08 | Stop reason: HOSPADM

## 2025-08-04 RX ORDER — CEFAZOLIN 1 G/1
INJECTION, POWDER, FOR SOLUTION INTRAVENOUS AS NEEDED
Status: DISCONTINUED | OUTPATIENT
Start: 2025-08-04 | End: 2025-08-04

## 2025-08-04 RX ORDER — MIDAZOLAM HYDROCHLORIDE 2 MG/2ML
INJECTION, SOLUTION INTRAMUSCULAR; INTRAVENOUS AS NEEDED
Status: DISCONTINUED | OUTPATIENT
Start: 2025-08-04 | End: 2025-08-04

## 2025-08-04 RX ORDER — SODIUM CHLORIDE, SODIUM LACTATE, POTASSIUM CHLORIDE, CALCIUM CHLORIDE 600; 310; 30; 20 MG/100ML; MG/100ML; MG/100ML; MG/100ML
100 INJECTION, SOLUTION INTRAVENOUS CONTINUOUS
Status: DISCONTINUED | OUTPATIENT
Start: 2025-08-04 | End: 2025-08-04 | Stop reason: HOSPADM

## 2025-08-04 RX ORDER — PROPOFOL 10 MG/ML
INJECTION, EMULSION INTRAVENOUS CONTINUOUS PRN
Status: DISCONTINUED | OUTPATIENT
Start: 2025-08-04 | End: 2025-08-04

## 2025-08-04 RX ORDER — ALBUTEROL SULFATE 90 UG/1
INHALANT RESPIRATORY (INHALATION) AS NEEDED
Status: DISCONTINUED | OUTPATIENT
Start: 2025-08-04 | End: 2025-08-04

## 2025-08-04 RX ORDER — SODIUM CHLORIDE 0.9 G/100ML
INJECTION, SOLUTION IRRIGATION AS NEEDED
Status: DISCONTINUED | OUTPATIENT
Start: 2025-08-04 | End: 2025-08-04 | Stop reason: HOSPADM

## 2025-08-04 RX ORDER — AMOXICILLIN 250 MG
2 CAPSULE ORAL 2 TIMES DAILY
Status: DISCONTINUED | OUTPATIENT
Start: 2025-08-04 | End: 2025-08-08 | Stop reason: HOSPADM

## 2025-08-04 RX ORDER — CALCIUM CHLORIDE INJECTION 100 MG/ML
INJECTION, SOLUTION INTRAVENOUS AS NEEDED
Status: DISCONTINUED | OUTPATIENT
Start: 2025-08-04 | End: 2025-08-04

## 2025-08-04 RX ORDER — OXYCODONE HYDROCHLORIDE 5 MG/1
5 TABLET ORAL EVERY 4 HOURS PRN
Status: DISCONTINUED | OUTPATIENT
Start: 2025-08-04 | End: 2025-08-08 | Stop reason: HOSPADM

## 2025-08-04 RX ORDER — NALOXONE HYDROCHLORIDE 0.4 MG/ML
0.2 INJECTION, SOLUTION INTRAMUSCULAR; INTRAVENOUS; SUBCUTANEOUS EVERY 5 MIN PRN
Status: DISCONTINUED | OUTPATIENT
Start: 2025-08-04 | End: 2025-08-08 | Stop reason: HOSPADM

## 2025-08-04 RX ORDER — ACETAMINOPHEN 325 MG/1
650 TABLET ORAL EVERY 6 HOURS
Status: DISCONTINUED | OUTPATIENT
Start: 2025-08-04 | End: 2025-08-08 | Stop reason: HOSPADM

## 2025-08-04 RX ORDER — ENALAPRIL MALEATE 20 MG/1
20 TABLET ORAL DAILY
Status: DISCONTINUED | OUTPATIENT
Start: 2025-08-05 | End: 2025-08-08 | Stop reason: HOSPADM

## 2025-08-04 RX ORDER — ALBUMIN HUMAN 50 G/1000ML
SOLUTION INTRAVENOUS AS NEEDED
Status: DISCONTINUED | OUTPATIENT
Start: 2025-08-04 | End: 2025-08-04

## 2025-08-04 RX ORDER — CYCLOBENZAPRINE HCL 10 MG
10 TABLET ORAL 3 TIMES DAILY PRN
Status: DISCONTINUED | OUTPATIENT
Start: 2025-08-04 | End: 2025-08-08 | Stop reason: HOSPADM

## 2025-08-04 RX ORDER — METHOCARBAMOL 100 MG/ML
1000 INJECTION, SOLUTION INTRAMUSCULAR; INTRAVENOUS ONCE
Status: DISCONTINUED | OUTPATIENT
Start: 2025-08-04 | End: 2025-08-05

## 2025-08-04 RX ORDER — PROMETHAZINE HYDROCHLORIDE 25 MG/1
25 SUPPOSITORY RECTAL EVERY 12 HOURS PRN
Status: DISCONTINUED | OUTPATIENT
Start: 2025-08-04 | End: 2025-08-08 | Stop reason: HOSPADM

## 2025-08-04 RX ORDER — ACETAMINOPHEN 10 MG/ML
INJECTION, SOLUTION INTRAVENOUS AS NEEDED
Status: DISCONTINUED | OUTPATIENT
Start: 2025-08-04 | End: 2025-08-04

## 2025-08-04 RX ORDER — POLYETHYLENE GLYCOL 3350 17 G/17G
17 POWDER, FOR SOLUTION ORAL DAILY
Status: DISCONTINUED | OUTPATIENT
Start: 2025-08-04 | End: 2025-08-07

## 2025-08-04 RX ORDER — INSULIN LISPRO 100 [IU]/ML
0-5 INJECTION, SOLUTION INTRAVENOUS; SUBCUTANEOUS
Status: DISCONTINUED | OUTPATIENT
Start: 2025-08-05 | End: 2025-08-08 | Stop reason: HOSPADM

## 2025-08-04 RX ORDER — LIDOCAINE HYDROCHLORIDE AND EPINEPHRINE 5; 5 MG/ML; UG/ML
INJECTION, SOLUTION INFILTRATION; PERINEURAL AS NEEDED
Status: DISCONTINUED | OUTPATIENT
Start: 2025-08-04 | End: 2025-08-04 | Stop reason: HOSPADM

## 2025-08-04 RX ORDER — PROMETHAZINE HYDROCHLORIDE 25 MG/1
25 TABLET ORAL EVERY 6 HOURS PRN
Status: DISCONTINUED | OUTPATIENT
Start: 2025-08-04 | End: 2025-08-08 | Stop reason: HOSPADM

## 2025-08-04 RX ORDER — ONDANSETRON HYDROCHLORIDE 2 MG/ML
INJECTION, SOLUTION INTRAVENOUS AS NEEDED
Status: DISCONTINUED | OUTPATIENT
Start: 2025-08-04 | End: 2025-08-04

## 2025-08-04 RX ORDER — OXYCODONE HYDROCHLORIDE 5 MG/1
10 TABLET ORAL EVERY 4 HOURS PRN
Status: DISCONTINUED | OUTPATIENT
Start: 2025-08-04 | End: 2025-08-08 | Stop reason: HOSPADM

## 2025-08-04 RX ORDER — SODIUM CHLORIDE 9 MG/ML
100 INJECTION, SOLUTION INTRAVENOUS CONTINUOUS
Status: ACTIVE | OUTPATIENT
Start: 2025-08-04 | End: 2025-08-05

## 2025-08-04 RX ORDER — SODIUM CHLORIDE, SODIUM LACTATE, POTASSIUM CHLORIDE, CALCIUM CHLORIDE 600; 310; 30; 20 MG/100ML; MG/100ML; MG/100ML; MG/100ML
INJECTION, SOLUTION INTRAVENOUS CONTINUOUS PRN
Status: DISCONTINUED | OUTPATIENT
Start: 2025-08-04 | End: 2025-08-04

## 2025-08-04 RX ORDER — HYDROMORPHONE HYDROCHLORIDE 1 MG/ML
0.2 INJECTION, SOLUTION INTRAMUSCULAR; INTRAVENOUS; SUBCUTANEOUS EVERY 4 HOURS PRN
Status: DISCONTINUED | OUTPATIENT
Start: 2025-08-04 | End: 2025-08-08 | Stop reason: HOSPADM

## 2025-08-04 RX ORDER — DEXTROSE 50 % IN WATER (D50W) INTRAVENOUS SYRINGE
12.5
Status: DISCONTINUED | OUTPATIENT
Start: 2025-08-04 | End: 2025-08-08 | Stop reason: HOSPADM

## 2025-08-04 RX ORDER — VANCOMYCIN HYDROCHLORIDE 1 G/20ML
INJECTION, POWDER, LYOPHILIZED, FOR SOLUTION INTRAVENOUS AS NEEDED
Status: DISCONTINUED | OUTPATIENT
Start: 2025-08-04 | End: 2025-08-04 | Stop reason: HOSPADM

## 2025-08-04 RX ORDER — ATENOLOL 25 MG/1
25 TABLET ORAL DAILY
Status: DISCONTINUED | OUTPATIENT
Start: 2025-08-04 | End: 2025-08-08 | Stop reason: HOSPADM

## 2025-08-04 RX ORDER — HEPARIN SODIUM 5000 [USP'U]/ML
5000 INJECTION, SOLUTION INTRAVENOUS; SUBCUTANEOUS EVERY 8 HOURS
Status: DISCONTINUED | OUTPATIENT
Start: 2025-08-05 | End: 2025-08-08 | Stop reason: HOSPADM

## 2025-08-04 RX ORDER — RAMELTEON 8 MG/1
8 TABLET ORAL NIGHTLY
Status: DISCONTINUED | OUTPATIENT
Start: 2025-08-04 | End: 2025-08-08 | Stop reason: HOSPADM

## 2025-08-04 RX ORDER — LIDOCAINE HYDROCHLORIDE 20 MG/ML
INJECTION, SOLUTION INFILTRATION; PERINEURAL AS NEEDED
Status: DISCONTINUED | OUTPATIENT
Start: 2025-08-04 | End: 2025-08-04

## 2025-08-04 RX ORDER — ONDANSETRON HYDROCHLORIDE 2 MG/ML
4 INJECTION, SOLUTION INTRAVENOUS EVERY 8 HOURS PRN
Status: DISCONTINUED | OUTPATIENT
Start: 2025-08-04 | End: 2025-08-08 | Stop reason: HOSPADM

## 2025-08-04 RX ORDER — ONDANSETRON HYDROCHLORIDE 2 MG/ML
4 INJECTION, SOLUTION INTRAVENOUS ONCE AS NEEDED
Status: DISCONTINUED | OUTPATIENT
Start: 2025-08-04 | End: 2025-08-04 | Stop reason: HOSPADM

## 2025-08-04 RX ORDER — FENTANYL CITRATE 50 UG/ML
50 INJECTION, SOLUTION INTRAMUSCULAR; INTRAVENOUS EVERY 5 MIN PRN
Status: DISCONTINUED | OUTPATIENT
Start: 2025-08-04 | End: 2025-08-04 | Stop reason: HOSPADM

## 2025-08-04 RX ORDER — VANCOMYCIN HYDROCHLORIDE 1 G/20ML
INJECTION, POWDER, LYOPHILIZED, FOR SOLUTION INTRAVENOUS AS NEEDED
Status: DISCONTINUED | OUTPATIENT
Start: 2025-08-04 | End: 2025-08-04

## 2025-08-04 RX ORDER — ACETAMINOPHEN 10 MG/ML
1000 INJECTION, SOLUTION INTRAVENOUS ONCE
Status: DISCONTINUED | OUTPATIENT
Start: 2025-08-04 | End: 2025-08-06

## 2025-08-04 RX ORDER — SUCCINYLCHOLINE CHLORIDE 20 MG/ML
INJECTION INTRAMUSCULAR; INTRAVENOUS AS NEEDED
Status: DISCONTINUED | OUTPATIENT
Start: 2025-08-04 | End: 2025-08-04

## 2025-08-04 RX ORDER — LIDOCAINE HYDROCHLORIDE 10 MG/ML
0.1 INJECTION, SOLUTION INFILTRATION; PERINEURAL ONCE
Status: DISCONTINUED | OUTPATIENT
Start: 2025-08-04 | End: 2025-08-04 | Stop reason: HOSPADM

## 2025-08-04 RX ORDER — HYDROMORPHONE HYDROCHLORIDE 0.2 MG/ML
0.2 INJECTION INTRAMUSCULAR; INTRAVENOUS; SUBCUTANEOUS EVERY 5 MIN PRN
Status: DISCONTINUED | OUTPATIENT
Start: 2025-08-04 | End: 2025-08-04 | Stop reason: HOSPADM

## 2025-08-04 RX ORDER — ONDANSETRON 4 MG/1
4 TABLET, FILM COATED ORAL EVERY 8 HOURS PRN
Status: DISCONTINUED | OUTPATIENT
Start: 2025-08-04 | End: 2025-08-08 | Stop reason: HOSPADM

## 2025-08-04 RX ORDER — FENTANYL CITRATE 50 UG/ML
INJECTION, SOLUTION INTRAMUSCULAR; INTRAVENOUS AS NEEDED
Status: DISCONTINUED | OUTPATIENT
Start: 2025-08-04 | End: 2025-08-04

## 2025-08-04 RX ORDER — HYDROMORPHONE HYDROCHLORIDE 1 MG/ML
INJECTION, SOLUTION INTRAMUSCULAR; INTRAVENOUS; SUBCUTANEOUS AS NEEDED
Status: DISCONTINUED | OUTPATIENT
Start: 2025-08-04 | End: 2025-08-04

## 2025-08-04 RX ORDER — PHENYLEPHRINE HCL IN 0.9% NACL 0.4MG/10ML
SYRINGE (ML) INTRAVENOUS AS NEEDED
Status: DISCONTINUED | OUTPATIENT
Start: 2025-08-04 | End: 2025-08-04

## 2025-08-04 RX ORDER — DEXTROSE 50 % IN WATER (D50W) INTRAVENOUS SYRINGE
25
Status: DISCONTINUED | OUTPATIENT
Start: 2025-08-04 | End: 2025-08-08 | Stop reason: HOSPADM

## 2025-08-04 RX ORDER — BACITRACIN 500 [USP'U]/G
OINTMENT TOPICAL AS NEEDED
Status: DISCONTINUED | OUTPATIENT
Start: 2025-08-04 | End: 2025-08-04 | Stop reason: HOSPADM

## 2025-08-04 RX ADMIN — ONDANSETRON 4 MG: 2 INJECTION INTRAMUSCULAR; INTRAVENOUS at 12:50

## 2025-08-04 RX ADMIN — SENNOSIDES, DOCUSATE SODIUM 2 TABLET: 50; 8.6 TABLET, FILM COATED ORAL at 20:12

## 2025-08-04 RX ADMIN — Medication 120 MCG: at 10:11

## 2025-08-04 RX ADMIN — PROPOFOL 50 MG: 10 INJECTION, EMULSION INTRAVENOUS at 07:47

## 2025-08-04 RX ADMIN — FENTANYL CITRATE 100 MCG: 50 INJECTION, SOLUTION INTRAMUSCULAR; INTRAVENOUS at 07:47

## 2025-08-04 RX ADMIN — ACETAMINOPHEN 650 MG: 325 TABLET ORAL at 18:13

## 2025-08-04 RX ADMIN — ALBUMIN HUMAN 250 ML: 0.05 INJECTION, SOLUTION INTRAVENOUS at 12:03

## 2025-08-04 RX ADMIN — HYDROMORPHONE HYDROCHLORIDE 0.4 MG: 1 INJECTION, SOLUTION INTRAMUSCULAR; INTRAVENOUS; SUBCUTANEOUS at 13:00

## 2025-08-04 RX ADMIN — Medication 120 MCG: at 13:41

## 2025-08-04 RX ADMIN — VANCOMYCIN HYDROCHLORIDE 1.5 G: 1025 INJECTION, POWDER, FOR SOLUTION INTRAVENOUS; ORAL at 08:00

## 2025-08-04 RX ADMIN — PROPOFOL 100 MCG/KG/MIN: 10 INJECTION, EMULSION INTRAVENOUS at 07:55

## 2025-08-04 RX ADMIN — OXYCODONE HYDROCHLORIDE 10 MG: 5 TABLET ORAL at 21:52

## 2025-08-04 RX ADMIN — ALBUMIN HUMAN 250 ML: 0.05 INJECTION, SOLUTION INTRAVENOUS at 11:37

## 2025-08-04 RX ADMIN — CEFAZOLIN 2 G: 1 INJECTION, POWDER, FOR SOLUTION INTRAMUSCULAR; INTRAVENOUS at 08:01

## 2025-08-04 RX ADMIN — SUCCINYLCHOLINE CHLORIDE 120 MG: 20 INJECTION, SOLUTION INTRAMUSCULAR; INTRAVENOUS at 07:49

## 2025-08-04 RX ADMIN — ALBUMIN HUMAN 250 ML: 0.05 INJECTION, SOLUTION INTRAVENOUS at 11:50

## 2025-08-04 RX ADMIN — CEFAZOLIN 2 G: 1 INJECTION, POWDER, FOR SOLUTION INTRAMUSCULAR; INTRAVENOUS at 11:54

## 2025-08-04 RX ADMIN — SODIUM CHLORIDE: 9 INJECTION, SOLUTION INTRAVENOUS at 09:31

## 2025-08-04 RX ADMIN — ALBUMIN HUMAN 250 ML: 0.05 INJECTION, SOLUTION INTRAVENOUS at 11:15

## 2025-08-04 RX ADMIN — Medication 120 MCG: at 13:08

## 2025-08-04 RX ADMIN — Medication 80 MCG: at 14:01

## 2025-08-04 RX ADMIN — POVIDONE-IODINE 1 APPLICATION: 5 SOLUTION TOPICAL at 07:04

## 2025-08-04 RX ADMIN — CALCIUM CHLORIDE 0.5 G: 100 INJECTION, SOLUTION INTRAVENOUS at 14:01

## 2025-08-04 RX ADMIN — SODIUM CHLORIDE: 9 INJECTION, SOLUTION INTRAVENOUS at 11:30

## 2025-08-04 RX ADMIN — PHENYLEPHRINE-NACL IV SOLUTION 10 MG/250ML-0.9% 0.2 MCG/KG/MIN: 10-0.9/25 SOLUTION at 08:23

## 2025-08-04 RX ADMIN — ACETAMINOPHEN 650 MG: 325 TABLET ORAL at 23:21

## 2025-08-04 RX ADMIN — SODIUM CHLORIDE, POTASSIUM CHLORIDE, SODIUM LACTATE AND CALCIUM CHLORIDE: 600; 310; 30; 20 INJECTION, SOLUTION INTRAVENOUS at 07:30

## 2025-08-04 RX ADMIN — HYDROMORPHONE HYDROCHLORIDE 0.2 MG: 0.2 INJECTION, SOLUTION INTRAMUSCULAR; INTRAVENOUS; SUBCUTANEOUS at 15:08

## 2025-08-04 RX ADMIN — SODIUM CHLORIDE 100 ML/HR: 0.9 INJECTION, SOLUTION INTRAVENOUS at 18:12

## 2025-08-04 RX ADMIN — HYDROMORPHONE HYDROCHLORIDE 0.2 MG: 0.2 INJECTION, SOLUTION INTRAMUSCULAR; INTRAVENOUS; SUBCUTANEOUS at 16:48

## 2025-08-04 RX ADMIN — HYDROMORPHONE HYDROCHLORIDE 0.4 MG: 1 INJECTION, SOLUTION INTRAMUSCULAR; INTRAVENOUS; SUBCUTANEOUS at 12:49

## 2025-08-04 RX ADMIN — MIDAZOLAM HYDROCHLORIDE 2 MG: 2 INJECTION, SOLUTION INTRAMUSCULAR; INTRAVENOUS at 07:35

## 2025-08-04 RX ADMIN — LIDOCAINE HYDROCHLORIDE 100 MG: 20 INJECTION, SOLUTION INFILTRATION; PERINEURAL at 07:47

## 2025-08-04 RX ADMIN — ALBUTEROL SULFATE 6 PUFF: 90 AEROSOL, METERED RESPIRATORY (INHALATION) at 08:30

## 2025-08-04 RX ADMIN — REMIFENTANIL HYDROCHLORIDE 0.05 MCG/KG/MIN: 1 INJECTION, POWDER, LYOPHILIZED, FOR SOLUTION INTRAVENOUS at 07:55

## 2025-08-04 RX ADMIN — HEPARIN SODIUM 5000 UNITS: 5000 INJECTION, SOLUTION INTRAVENOUS; SUBCUTANEOUS at 23:21

## 2025-08-04 RX ADMIN — ACETAMINOPHEN 1000 MG: 10 INJECTION, SOLUTION INTRAVENOUS at 12:45

## 2025-08-04 SDOH — ECONOMIC STABILITY: FOOD INSECURITY: HOW HARD IS IT FOR YOU TO PAY FOR THE VERY BASICS LIKE FOOD, HOUSING, MEDICAL CARE, AND HEATING?: NOT HARD AT ALL

## 2025-08-04 SDOH — SOCIAL STABILITY: SOCIAL INSECURITY: HAS ANYONE EVER THREATENED TO HURT YOUR FAMILY OR YOUR PETS?: NO

## 2025-08-04 SDOH — SOCIAL STABILITY: SOCIAL INSECURITY
WITHIN THE LAST YEAR, HAVE YOU BEEN RAPED OR FORCED TO HAVE ANY KIND OF SEXUAL ACTIVITY BY YOUR PARTNER OR EX-PARTNER?: NO

## 2025-08-04 SDOH — ECONOMIC STABILITY: FOOD INSECURITY: WITHIN THE PAST 12 MONTHS, YOU WORRIED THAT YOUR FOOD WOULD RUN OUT BEFORE YOU GOT THE MONEY TO BUY MORE.: NEVER TRUE

## 2025-08-04 SDOH — ECONOMIC STABILITY: HOUSING INSECURITY: AT ANY TIME IN THE PAST 12 MONTHS, WERE YOU HOMELESS OR LIVING IN A SHELTER (INCLUDING NOW)?: NO

## 2025-08-04 SDOH — SOCIAL STABILITY: SOCIAL INSECURITY: WITHIN THE LAST YEAR, HAVE YOU BEEN AFRAID OF YOUR PARTNER OR EX-PARTNER?: NO

## 2025-08-04 SDOH — ECONOMIC STABILITY: FOOD INSECURITY: WITHIN THE PAST 12 MONTHS, THE FOOD YOU BOUGHT JUST DIDN'T LAST AND YOU DIDN'T HAVE MONEY TO GET MORE.: NEVER TRUE

## 2025-08-04 SDOH — SOCIAL STABILITY: SOCIAL INSECURITY: DO YOU FEEL UNSAFE GOING BACK TO THE PLACE WHERE YOU ARE LIVING?: NO

## 2025-08-04 SDOH — SOCIAL STABILITY: SOCIAL INSECURITY: WERE YOU ABLE TO COMPLETE ALL THE BEHAVIORAL HEALTH SCREENINGS?: YES

## 2025-08-04 SDOH — HEALTH STABILITY: MENTAL HEALTH: CURRENT SMOKER: 0

## 2025-08-04 SDOH — ECONOMIC STABILITY: INCOME INSECURITY: IN THE PAST 12 MONTHS HAS THE ELECTRIC, GAS, OIL, OR WATER COMPANY THREATENED TO SHUT OFF SERVICES IN YOUR HOME?: NO

## 2025-08-04 SDOH — ECONOMIC STABILITY: HOUSING INSECURITY: IN THE LAST 12 MONTHS, WAS THERE A TIME WHEN YOU WERE NOT ABLE TO PAY THE MORTGAGE OR RENT ON TIME?: NO

## 2025-08-04 SDOH — SOCIAL STABILITY: SOCIAL INSECURITY: HAVE YOU HAD THOUGHTS OF HARMING ANYONE ELSE?: NO

## 2025-08-04 SDOH — SOCIAL STABILITY: SOCIAL INSECURITY: WITHIN THE LAST YEAR, HAVE YOU BEEN HUMILIATED OR EMOTIONALLY ABUSED IN OTHER WAYS BY YOUR PARTNER OR EX-PARTNER?: NO

## 2025-08-04 SDOH — ECONOMIC STABILITY: TRANSPORTATION INSECURITY: IN THE PAST 12 MONTHS, HAS LACK OF TRANSPORTATION KEPT YOU FROM MEDICAL APPOINTMENTS OR FROM GETTING MEDICATIONS?: NO

## 2025-08-04 SDOH — SOCIAL STABILITY: SOCIAL INSECURITY: DOES ANYONE TRY TO KEEP YOU FROM HAVING/CONTACTING OTHER FRIENDS OR DOING THINGS OUTSIDE YOUR HOME?: NO

## 2025-08-04 SDOH — SOCIAL STABILITY: SOCIAL INSECURITY: ARE THERE ANY APPARENT SIGNS OF INJURIES/BEHAVIORS THAT COULD BE RELATED TO ABUSE/NEGLECT?: NO

## 2025-08-04 SDOH — SOCIAL STABILITY: SOCIAL INSECURITY
WITHIN THE LAST YEAR, HAVE YOU BEEN KICKED, HIT, SLAPPED, OR OTHERWISE PHYSICALLY HURT BY YOUR PARTNER OR EX-PARTNER?: NO

## 2025-08-04 SDOH — ECONOMIC STABILITY: HOUSING INSECURITY: IN THE PAST 12 MONTHS, HOW MANY TIMES HAVE YOU MOVED WHERE YOU WERE LIVING?: 1

## 2025-08-04 SDOH — SOCIAL STABILITY: SOCIAL INSECURITY: DO YOU FEEL ANYONE HAS EXPLOITED OR TAKEN ADVANTAGE OF YOU FINANCIALLY OR OF YOUR PERSONAL PROPERTY?: NO

## 2025-08-04 SDOH — SOCIAL STABILITY: SOCIAL INSECURITY: HAVE YOU HAD ANY THOUGHTS OF HARMING ANYONE ELSE?: NO

## 2025-08-04 SDOH — SOCIAL STABILITY: SOCIAL INSECURITY: ABUSE: ADULT

## 2025-08-04 SDOH — SOCIAL STABILITY: SOCIAL INSECURITY
ASK PARENT OR GUARDIAN: ARE THERE TIMES WHEN YOU, YOUR CHILD(REN), OR ANY MEMBER OF YOUR HOUSEHOLD FEEL UNSAFE, HARMED, OR THREATENED AROUND PERSONS WITH WHOM YOU KNOW OR LIVE?: NO

## 2025-08-04 SDOH — SOCIAL STABILITY: SOCIAL INSECURITY: ARE YOU OR HAVE YOU BEEN THREATENED OR ABUSED PHYSICALLY, EMOTIONALLY, OR SEXUALLY BY ANYONE?: NO

## 2025-08-04 ASSESSMENT — COLUMBIA-SUICIDE SEVERITY RATING SCALE - C-SSRS
1. IN THE PAST MONTH, HAVE YOU WISHED YOU WERE DEAD OR WISHED YOU COULD GO TO SLEEP AND NOT WAKE UP?: NO
6. HAVE YOU EVER DONE ANYTHING, STARTED TO DO ANYTHING, OR PREPARED TO DO ANYTHING TO END YOUR LIFE?: NO
2. HAVE YOU ACTUALLY HAD ANY THOUGHTS OF KILLING YOURSELF?: NO

## 2025-08-04 ASSESSMENT — PAIN SCALES - GENERAL
PAINLEVEL_OUTOF10: 7
PAINLEVEL_OUTOF10: 5 - MODERATE PAIN
PAINLEVEL_OUTOF10: 5 - MODERATE PAIN
PAINLEVEL_OUTOF10: 7
PAINLEVEL_OUTOF10: 0 - NO PAIN
PAINLEVEL_OUTOF10: 6
PAINLEVEL_OUTOF10: 8
PAINLEVEL_OUTOF10: 5 - MODERATE PAIN
PAINLEVEL_OUTOF10: 7
PAINLEVEL_OUTOF10: 4
PAINLEVEL_OUTOF10: 7
PAINLEVEL_OUTOF10: 6

## 2025-08-04 ASSESSMENT — ACTIVITIES OF DAILY LIVING (ADL)
HEARING - RIGHT EAR: FUNCTIONAL
TOILETING: NEEDS ASSISTANCE
GROOMING: INDEPENDENT
ADEQUATE_TO_COMPLETE_ADL: YES
LACK_OF_TRANSPORTATION: NO
HEARING - LEFT EAR: FUNCTIONAL
DRESSING YOURSELF: INDEPENDENT
LACK_OF_TRANSPORTATION: NO
WALKS IN HOME: NEEDS ASSISTANCE
FEEDING YOURSELF: INDEPENDENT
PATIENT'S MEMORY ADEQUATE TO SAFELY COMPLETE DAILY ACTIVITIES?: YES
TOILETING: NEEDS ASSISTANCE
ASSISTIVE_DEVICE: WALKER
BATHING: INDEPENDENT
FEEDING YOURSELF: INDEPENDENT
WALKS IN HOME: NEEDS ASSISTANCE
JUDGMENT_ADEQUATE_SAFELY_COMPLETE_DAILY_ACTIVITIES: YES
ADEQUATE_TO_COMPLETE_ADL: YES
GROOMING: INDEPENDENT
ASSISTIVE_DEVICE: WALKER
JUDGMENT_ADEQUATE_SAFELY_COMPLETE_DAILY_ACTIVITIES: YES
BATHING: INDEPENDENT
HEARING - RIGHT EAR: FUNCTIONAL
DRESSING YOURSELF: INDEPENDENT
HEARING - LEFT EAR: FUNCTIONAL

## 2025-08-04 ASSESSMENT — COGNITIVE AND FUNCTIONAL STATUS - GENERAL
DAILY ACTIVITIY SCORE: 22
DRESSING REGULAR LOWER BODY CLOTHING: A LITTLE
STANDING UP FROM CHAIR USING ARMS: A LITTLE
MOVING TO AND FROM BED TO CHAIR: A LITTLE
MOBILITY SCORE: 20
PATIENT BASELINE BEDBOUND: NO
TOILETING: A LITTLE
WALKING IN HOSPITAL ROOM: A LITTLE
CLIMB 3 TO 5 STEPS WITH RAILING: A LITTLE

## 2025-08-04 ASSESSMENT — PAIN - FUNCTIONAL ASSESSMENT
PAIN_FUNCTIONAL_ASSESSMENT: 0-10
PAIN_FUNCTIONAL_ASSESSMENT: 0-10
PAIN_FUNCTIONAL_ASSESSMENT: UNABLE TO SELF-REPORT
PAIN_FUNCTIONAL_ASSESSMENT: 0-10
PAIN_FUNCTIONAL_ASSESSMENT: UNABLE TO SELF-REPORT
PAIN_FUNCTIONAL_ASSESSMENT: UNABLE TO SELF-REPORT
PAIN_FUNCTIONAL_ASSESSMENT: 0-10
PAIN_FUNCTIONAL_ASSESSMENT: UNABLE TO SELF-REPORT
PAIN_FUNCTIONAL_ASSESSMENT: 0-10
PAIN_FUNCTIONAL_ASSESSMENT: UNABLE TO SELF-REPORT
PAIN_FUNCTIONAL_ASSESSMENT: UNABLE TO SELF-REPORT
PAIN_FUNCTIONAL_ASSESSMENT: 0-10
PAIN_FUNCTIONAL_ASSESSMENT: UNABLE TO SELF-REPORT

## 2025-08-04 ASSESSMENT — LIFESTYLE VARIABLES
AUDIT-C TOTAL SCORE: 1
HOW MANY STANDARD DRINKS CONTAINING ALCOHOL DO YOU HAVE ON A TYPICAL DAY: 1 OR 2
HOW OFTEN DO YOU HAVE 6 OR MORE DRINKS ON ONE OCCASION: NEVER
HOW OFTEN DO YOU HAVE A DRINK CONTAINING ALCOHOL: MONTHLY OR LESS
AUDIT-C TOTAL SCORE: 1
SKIP TO QUESTIONS 9-10: 1

## 2025-08-04 NOTE — ANESTHESIA PROCEDURE NOTES
Arterial Line:    Date/Time: 8/4/2025 8:25 AM    Staffing  Performed: attending   Authorized by: Andrew Lam MD    Performed by: Shabana Lala    An arterial line was placed.   Procedure performed using ultrasound guidance.in the OR for the following indication(s): continuous blood pressure monitoring and blood sampling needed.    A 20 gauge (size), 12 cm (length), Arrow (type) catheter was placed into the Left radial artery, secured by Tegaderm,   Seldinger technique used.  Events:  patient tolerated procedure well with no complications.      Additional notes:  Failed attempt using landmark technique, successful attempt using ultrasound.          images stored in chart

## 2025-08-04 NOTE — BRIEF OP NOTE
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]     * Spinal stenosis of lumbar region, unspecified whether neurogenic claudication present [M48.061]     * Neurogenic claudication [R29.818]     * Spondylolisthesis at L5-S1 level [M43.17]     Procedures  T11-L1 transpedicular decompression; T10-L2 posterior fusion  87190 - DE ARTHRODESIS POSTERIOR/PSTLAT TQ 1NTRSPC THORACIC    DE POSTERIOR SEGMENTAL INSTRUMENTATION 3-6 VRT SEG []  DE ALLOGRAFT FOR SPINE SURGERY ONLY MORSELIZED []  DE AUTOGRAFT SPINE SURGERY LOCAL FROM SAME INCISION []  DE ARTHRODESIS PST/PSTLAT TQ 1NTRSPC EA ADDL NTRSPC [26531]  DE TRANSPEDICULAR DCMPRN SPINAL CORD 1 SEG THORACIC [24129]  DE TRANSPEDICULAR DCMPRN 1 SEG EA THORACIC/LUMBAR [28438]  Surgeons      * Mic Harris - Primary    Resident/Fellow/Other Assistant:  Surgeons and Role:     * Jarek Oseguera MD - Resident - Assisting    Staff:   Circulator: Charli Schaefer Person: Chau  Relief Scrub: Bairon  Relief Circulator: Bairon    Anesthesia Staff: Anesthesiologist: Niurka Anne MD; Andrew Lam MD  CRNA: CAROLINA Young-CRNA  C-AA: ELSI Goodman  SRNA: Shabana Lala    Procedure Summary  Anesthesia: General  ASA: III  Estimated Blood Loss: 700mL  Intra-op Medications:   Administrations occurring from 0650 to 1250 on 25:   Medication Name Total Dose   lidocaine-epinephrine (Xylocaine W/EPI) 0.5 %-1:200,000 injection 10 mL   vancomycin (Vancocin) vial for injection 1 g   polymyxin B 500,000 Units in sodium chloride  0.9% 1,000 mL irrigation 500,000 Units   sodium chloride 0.9 % irrigation solution 1,000 mL   acetaminophen (Ofirmev) injection 1,000 mg   albumin human 5 % 1,000 mL   albuterol (Proventil, Ventolin, ProAir) inhaler 108 (90 BASE) mcg/act 6 puff   ceFAZolin (Ancef) vial 1 g 4 g   fentaNYL (Sublimaze) injection 50 mcg/mL 100 mcg   HYDROmorphone (Dilaudid) injection 1 mg/mL 0.4 mg   LR infusion Cannot be calculated   lidocaine (Xylocaine) injection 2 % 100 mg   midazolam PF (Versed) injection 1 mg/mL 2 mg   ondansetron (Zofran) 2 mg/mL injection 4 mg   phenylephrine (Caesar-Synephrine) 10 mg/250 mL NS (40 mcg/mL) infusion 13.5 mg   phenylephrine 40 mcg/mL syringe 10 mL 120 mcg   propofol (Diprivan) injection 10 mg/mL 3,230.6 mg   remifentanil (Ultiva) 1,000 mcg in sodium chloride 0.9% 50 mL (20 mcg/mL) infusion 3.2 mg   NaCl 0.9 % bolus Cannot be calculated   succinylcholine (Anectine) 20 mg/mL injection 120 mg   vancomycin 1 g 1.5 g   povidone-iodine 5 % kit kit 1 Application              Anesthesia Record               Intraprocedure I/O Totals          Intake    NaCl 0.9 % bolus 500.00 mL    Remifentanil Drip 0.00 mL    The total shown is the total volume documented since Anesthesia Start was filed.    Phenylephrine Drip 0.00 mL    The total shown is the total volume documented since Anesthesia Start was filed.    LR infusion 1000.00 mL    Total Intake 1500 mL       Output    Urine 1545 mL    Est. Blood Loss 1700 mL    Total Output 3245 mL       Net    Net Volume -1745 mL          Specimen: No specimens collected               Findings: good decompression    Complications:  None; patient tolerated the procedure well.     Disposition: PACU - hemodynamically stable.  Condition: stable  Specimens Collected: No specimens collected  Attending Attestation:     Mic Harris  Phone Number: [phone]

## 2025-08-04 NOTE — ANESTHESIA POSTPROCEDURE EVALUATION
Patient: Jared Cox    Procedure Summary       Date: 08/04/25 Room / Location: [facility] OR 10 / [facility] OR    Anesthesia Start: 0736 Anesthesia Stop: 1346    Procedure: T11-L1 transpedicular decompression; T10-L2 posterior fusion Diagnosis:       Spondylosis with myelopathy, thoracic region      Spinal stenosis of lumbar region, unspecified whether neurogenic claudication present      Neurogenic claudication      Spondylolisthesis at L5-S1 level      (Spondylosis with myelopathy, thoracic region [M47.14])      (Spinal stenosis of lumbar region, unspecified whether neurogenic claudication present [M48.061])      (Neurogenic claudication [R29.818])      (Spondylolisthesis at L5-S1 level [M43.17])    Surgeons: Mic Harris MD PhD Responsible Provider: Niurka Anne MD    Anesthesia Type: general ASA Status: 3            Anesthesia Type: general    Vitals Value Taken Time   /66 08/04/25 14:46   Temp 36 °C (96.8 °F) 08/04/25 13:26   Pulse 84 08/04/25 14:57   Resp 8 08/04/25 14:57   SpO2 97 % 08/04/25 14:57   Vitals shown include unfiled device data.    Anesthesia Post Evaluation    Patient location during evaluation: PACU  Patient participation: complete - patient participated  Level of consciousness: awake and awake and alert  Pain management: adequate  Airway patency: patent  Cardiovascular status: acceptable  Respiratory status: acceptable  Hydration status: acceptable  Postoperative Nausea and Vomiting: none        There were no known notable events for this encounter.

## 2025-08-04 NOTE — ANESTHESIA PROCEDURE NOTES
Peripheral IV  Date/Time: 8/4/2025 7:55 AM  Inserted by: Andrew Lam MD    Placement  Needle size: 14 G  Laterality: right  Location: hand  Local anesthetic: none  Site prep: alcohol  Technique: anatomical landmarks  Attempts: 1

## 2025-08-04 NOTE — ANESTHESIA PROCEDURE NOTES
Airway  Date/Time: 8/4/2025 7:51 AM  Reason: elective    Airway not difficult    Staffing  Performed: RADHA   Authorized by: Andrew Lam MD    Performed by: Shabana Lala  Patient location during procedure: OR    Patient Condition  Indications for airway management: anesthesia and airway protection  Patient position: sniffing  Sedation level: deep     Final Airway Details   Preoxygenated: yes  Final airway type: endotracheal airway  Successful airway: ETT  Cuffed: yes   Successful intubation technique: video laryngoscopy (Rubio)  Adjuncts used in placement: intubating stylet  Endotracheal tube insertion site: oral  Blade: Collin  Blade size: #4  ETT size (mm): 7.5  Cormack-Lehane Classification: grade I - full view of glottis  Placement verified by: chest auscultation and capnometry   Measured from: lips  ETT to lips (cm): 22  Number of attempts at approach: 1    Additional Comments  Atraumtic intubation, lips and teeth in preintubation condition

## 2025-08-04 NOTE — OP NOTE
T11-L1 laminectomies; right T12 & L1 transpedicular decompression; T11-L2 instrumented posterolateral fusion operative note     Date: 2025  OR Location: [facility] OR    Name: Jared Cox, : 1960, Age: 64 y.o., MRN: [id number], Sex: male    Diagnosis  Pre-op Diagnosis      * Spondylosis with myelopathy, thoracic region [M47.14]     * Spinal stenosis of lumbar region, unspecified whether neurogenic claudication present [M48.061]     * Neurogenic claudication [R29.818]     * Spondylolisthesis at L5-S1 level [M43.17] Post-op Diagnosis     * Spondylosis with myelopathy, thoracic region [M47.14]     * Spinal stenosis of lumbar region, unspecified whether neurogenic claudication present [M48.061]     * Neurogenic claudication [R29.818]     * Spondylolisthesis at L5-S1 level [M43.17]     Procedures  T11-L1 laminectomies; right T12 & L1 transpedicular approach for T11-T12 & T12-L1 discectomies; T11-L2 instrumented posterolateral fusion; use of autograft & allograft for fusion; use of spinal stereotactic navigation    Surgeons      * Mic Harris - Primary    Resident/Fellow/Other Assistant:  Surgeons and Role:     * Jarek Oseguera MD - Resident - Assisting    Staff:   Mehrdad: Charli Schaefer Person: Chau Bush Scrub: Bairon  Relief Circulator: Bairon    Anesthesia Staff: Anesthesiologist: Niurka Anne MD; Andrew Lam MD  CRNA: CAROLINA Young-CRNA  C-AA: ELSI Goodman  SRNA: Shabana Lala    Procedure Summary  Anesthesia: General  ASA: III  Estimated Blood Loss: 700 mL  Intra-op Medications:   Administrations occurring from 0650 to 1250 on 25:   Medication Name Total Dose   lidocaine-epinephrine (Xylocaine W/EPI) 0.5 %-1:200,000 injection 10 mL   vancomycin (Vancocin) vial for injection 1 g   polymyxin B 500,000 Units in sodium chloride 0.9% 1,000 mL irrigation 500,000 Units   sodium chloride 0.9 % irrigation solution 1,000 mL   povidone-iodine 5 % kit kit 1  Application   acetaminophen (Ofirmev) injection 1,000 mg   albumin human 5 % 1,000 mL   albuterol (Proventil, Ventolin, ProAir) inhaler 108 (90 BASE) mcg/act 6 puff   ceFAZolin (Ancef) vial 1 g 4 g   fentaNYL (Sublimaze) injection 50 mcg/mL 100 mcg   HYDROmorphone (Dilaudid) injection 1 mg/mL 0.4 mg   LR infusion Cannot be calculated   lidocaine (Xylocaine) injection 2 % 100 mg   midazolam PF (Versed) injection 1 mg/mL 2 mg   ondansetron (Zofran) 2 mg/mL injection 4 mg   phenylephrine (Caesar-Synephrine) 10 mg/250 mL NS (40 mcg/mL) infusion 13.5 mg   phenylephrine 40 mcg/mL syringe 10 mL 120 mcg   propofol (Diprivan) injection 10 mg/mL 3,230.6 mg   remifentanil (Ultiva) 1,000 mcg in sodium chloride 0.9% 50 mL (20 mcg/mL) infusion 3.2 mg   NaCl 0.9 % bolus Cannot be calculated   succinylcholine (Anectine) 20 mg/mL injection 120 mg   vancomycin 1 g 1.5 g              Anesthesia Record               Intraprocedure I/O Totals          Intake    NaCl 0.9 % bolus 500.00 mL    Remifentanil Drip 0.00 mL    The total shown is the total volume documented since Anesthesia Start was filed.    Phenylephrine Drip 0.00 mL    The total shown is the total volume documented since Anesthesia Start was filed.    LR infusion 1050.00 mL    Total Intake 1550 mL       Output    Urine 1545 mL    Est. Blood Loss 1700 mL    Total Output 3245 mL       Net    Net Volume -1695 mL          Specimen: No specimens collected              Drains and/or Catheters:   Closed/Suction Drain Left;Superior Back Accordion 15 Fr. (Active)       Urethral Catheter Coude;Non-latex 16 Fr. (Active)       Implants:  Implants       Type Name Action Serial No.      Graft BONE GRAFT SUBSTITUTE, ACTIFUSE, LG STRIP, 90MM X 25MM X 7MM - [id number] Implanted       6.5x55 ats screws Implanted      Spinal Hardware SCREW, SOLERA 5.5/6.0 ATS, 7.5 X 50 - [id number] Implanted      Spinal Hardware SCREW, SOLERA 5.5/6.0 ATS, 7.5 X 55 - [id number] Implanted      Screw SET SCREW, 5.5,  TI NS BRK OFF - [id number] Implanted      Spinal Hardware HEATHER, 5.5 Tl AL NX STRT LN 500MM - [id number] Implanted            INDICATIONS FOR THE PROCEDURE: Jared Cox is an 64 y.o. male who presented to clinic with chronic progressive balance & gait dysfunction.  He also experienced heaviness and cramping pain in both legs.  His imaging showed large calcified right paracentric disc herniations at T11-T12 & T12-L1, resulting in severe stenosis.  I discussed various treatment options with the patient, including further conservative therapy as well as different surgical options.  I offered a decompression and fusion.  I discussed the risks of surgery, including infection, blood loss, neurologic injury including spinal cord injury, spinal fluid leak, and the need for further procedures.  Having acknowledged the risks and benefits, the patient elected to proceed with surgery.    DESCRIPTION OF THE OPERATION: The patient was brought to the operating room.  After patient identification and procedure confirmation, an endotracheal intubation was performed.  Neuromonitoring leads were placed for MEPs and SSEPs.  The patient was flipped prone onto an open Scott table.  The arms were secured and all pressure points were padded.  The field was prepped and draped in the usual sterile fashion.  A time out was performed and perioperative intravenous antibiotics were confirmed.  Following localizing fluoroscopy, a midline incision was designed and infiltrated with local anesthetic with epinephrine.  The skin was then incised with a #10 blade.  With the use of monopolar electrocautery, a subperiosteal dissection was performed at T11-L2.  Fluoroscopy was used to confirm the correct levels.  The O-arm ([employer]) was brought into the room and an intra-operative scan was obtained.  The images were uploaded into the StealthStation ([employer]).  Next, pedicle screws ([employer]) were placed bilaterally at T11-L2, skipping T12 & L1 on  the right.  First, a high-speed drill was used to decorticate the entry points.  A navigated gearshift was then used to cannulate the pedicles.  A ball-tip probe was used to confirm safe trajectories.  The pedicle screws were then placed with a navigated manual .  Fluoroscopy was used to confirm proper screw placement.    We then turned our attention to the decompression.  Using a Leksell rongeur, Kerrison rongeurs and a high-speed drill, laminectomies were performed at T11-L1.  We then performed total facetectomies on the right at T11-T12, T12-L1 & L1-L2.  While preserving the exiting T12 & L1 nerve roots, we resected the right T12 & L1 pedicles.  Using a curette, we then carefully established a plan between the thecal sac and posterior longitudinal ligament.  Using curettes, a navigated osteotome and Pituitary forceps, we then resected a small portion of the vertebral bodies T11, T12 and L1.  Using curettes and Pituitary forceps, we then carefully resected the calcified disc herniations at T11-T12 and T12-L1.  Neuromonitoring remained at baseline during the decompression.    We then turned our attention to the posterolateral fusion.  Titanium rods were designed, cut and bent, then laid into position.  Set screws were applied and final tightened.  The surgical field was copiously irrigated.  A posterolateral fusion was then performed by decorticating the facet joints and transverse processes of the instrumented vertebrae.  A mixture of autograft and allograft (Jacinto Actifuse) was placed along the fusion corridor at T11-L2.  Final fluoroscopy was used to confirm that the instrumentation was appropriately positioned.    We then turned our attention to the closure.  Excellent hemostasis was achieved.  Vancomycin powder was dispersed throughout the wound.  A subfascial surgical drain was placed.  The muscle and fascial layers were closed with interrupted stitches using 0 Vicryl suture.  The subcutaneous layer  was closed with interrupted stitches using 2-0 Vicryl suture.  The skin was closed with staples.  The skin was cleaned and a wound VAC system was applied.    There were no intraoperative complications.  The counts were reported to be correct by the nursing staff.  After extubation, the patient was taken to the recovery room in stable condition.    Disposition: PACU - hemodynamically stable.    Condition: stable    Attending Attestation: I was present and scrubbed for the key portions of the procedure.    Mic Harris MD PhD

## 2025-08-04 NOTE — ANESTHESIA PREPROCEDURE EVALUATION
Patient: Jared Cox    Procedure Information       Date/Time: 08/04/25 0650    Procedure: T11-L1 transpedicular decompression; T10-L2 posterior fusion - DOCTOR REQUESTS 4 HRS FOR THIS PROCEDURE  ALL CPT CODES FOR THIS PROCEDURE  94832, 70365, 99706, 54237, 22614 x3, 27849, 14904, 27115    Location: [facility] OR 10 / [facility] OR    Surgeons: Mic Harris MD PhD            Relevant Problems   Anesthesia (within normal limits)      Cardiac   (+) HTN (hypertension)      Pulmonary  Cold 3 weeks ago, feels it has resolved      Neuro (within normal limits)      GI   (+) Hiatal hernia      /Renal  CKD 3a      Liver (within normal limits)      Endocrine   (+) Diabetes mellitus, type 2 (Multi)      Hematology (within normal limits)      Musculoskeletal   (+) Spinal stenosis of lumbar region      HEENT (within normal limits)      ID  MSSA+      Skin (within normal limits)     Vitals:    08/04/25 0639   BP: (!) 163/96   Pulse: 84   Resp: 16   Temp: 36.6 °C (97.9 °F)   SpO2: 95%       Clinical information reviewed:   Tobacco  Allergies  Meds   Med Hx  Surg Hx   Fam Hx  Soc Hx        NPO Detail:  NPO/Void Status  Date of Last Liquid: 08/03/25  Time of Last Liquid: 2230  Date of Last Solid: 08/03/25  Time of Last Solid: 1900         Physical Exam    Airway  Mallampati: III  TM distance: >3 FB  Neck ROM: full  Mouth opening: 3 or more finger widths     Cardiovascular   Rhythm: regular     Dental - normal exam  Comments: Chipped right upper incisor     Pulmonary Breath sounds clear to auscultation     Abdominal            Anesthesia Plan    History of general anesthesia?: yes  History of complications of general anesthesia?: no    ASA 3     general     The patient is not a current smoker.    intravenous induction   Postoperative pain plan includes opioids.  Trial extubation is planned.  Anesthetic plan and risks discussed with patient.  Use of blood products discussed with patient who consented to blood  products.    Plan discussed with CRNA and attending.

## 2025-08-05 ENCOUNTER — APPOINTMENT (OUTPATIENT)
Dept: RADIOLOGY | Facility: HOSPITAL | Age: 65
End: 2025-08-05
Payer: COMMERCIAL

## 2025-08-05 LAB
ALBUMIN SERPL BCP-MCNC: 4 G/DL (ref 3.4–5)
ANION GAP SERPL CALC-SCNC: 10 MMOL/L (ref 10–20)
ANION GAP SERPL CALC-SCNC: 12 MMOL/L (ref 10–20)
BUN SERPL-MCNC: 11 MG/DL (ref 6–23)
BUN SERPL-MCNC: 15 MG/DL (ref 6–23)
CALCIUM SERPL-MCNC: 7.9 MG/DL (ref 8.6–10.6)
CALCIUM SERPL-MCNC: 8.2 MG/DL (ref 8.6–10.6)
CHLORIDE SERPL-SCNC: 103 MMOL/L (ref 98–107)
CHLORIDE SERPL-SCNC: 106 MMOL/L (ref 98–107)
CO2 SERPL-SCNC: 24 MMOL/L (ref 21–32)
CO2 SERPL-SCNC: 27 MMOL/L (ref 21–32)
CREAT SERPL-MCNC: 0.86 MG/DL (ref 0.5–1.3)
CREAT SERPL-MCNC: 1.06 MG/DL (ref 0.5–1.3)
EGFRCR SERPLBLD CKD-EPI 2021: 78 ML/MIN/1.73M*2
EGFRCR SERPLBLD CKD-EPI 2021: >90 ML/MIN/1.73M*2
ERYTHROCYTE [DISTWIDTH] IN BLOOD BY AUTOMATED COUNT: 14.4 % (ref 11.5–14.5)
ERYTHROCYTE [DISTWIDTH] IN BLOOD BY AUTOMATED COUNT: 14.5 % (ref 11.5–14.5)
GLUCOSE BLD MANUAL STRIP-MCNC: 149 MG/DL (ref 74–99)
GLUCOSE BLD MANUAL STRIP-MCNC: 163 MG/DL (ref 74–99)
GLUCOSE BLD MANUAL STRIP-MCNC: 167 MG/DL (ref 74–99)
GLUCOSE BLD MANUAL STRIP-MCNC: 177 MG/DL (ref 74–99)
GLUCOSE SERPL-MCNC: 131 MG/DL (ref 74–99)
GLUCOSE SERPL-MCNC: 142 MG/DL (ref 74–99)
HCT VFR BLD AUTO: 34.4 % (ref 41–52)
HCT VFR BLD AUTO: 36 % (ref 41–52)
HGB BLD-MCNC: 10.7 G/DL (ref 13.5–17.5)
HGB BLD-MCNC: 11.4 G/DL (ref 13.5–17.5)
MCH RBC QN AUTO: 28.8 PG (ref 26–34)
MCH RBC QN AUTO: 28.9 PG (ref 26–34)
MCHC RBC AUTO-ENTMCNC: 31.1 G/DL (ref 32–36)
MCHC RBC AUTO-ENTMCNC: 31.7 G/DL (ref 32–36)
MCV RBC AUTO: 91 FL (ref 80–100)
MCV RBC AUTO: 93 FL (ref 80–100)
NRBC BLD-RTO: 0 /100 WBCS (ref 0–0)
NRBC BLD-RTO: 0 /100 WBCS (ref 0–0)
PHOSPHATE SERPL-MCNC: 2.9 MG/DL (ref 2.5–4.9)
PLATELET # BLD AUTO: 121 X10*3/UL (ref 150–450)
PLATELET # BLD AUTO: 144 X10*3/UL (ref 150–450)
POTASSIUM SERPL-SCNC: 3.7 MMOL/L (ref 3.5–5.3)
POTASSIUM SERPL-SCNC: 4.1 MMOL/L (ref 3.5–5.3)
RBC # BLD AUTO: 3.72 X10*6/UL (ref 4.5–5.9)
RBC # BLD AUTO: 3.95 X10*6/UL (ref 4.5–5.9)
SODIUM SERPL-SCNC: 136 MMOL/L (ref 136–145)
SODIUM SERPL-SCNC: 138 MMOL/L (ref 136–145)
WBC # BLD AUTO: 10 X10*3/UL (ref 4.4–11.3)
WBC # BLD AUTO: 11 X10*3/UL (ref 4.4–11.3)

## 2025-08-05 PROCEDURE — 72080 X-RAY EXAM THORACOLMB 2/> VW: CPT | Performed by: RADIOLOGY

## 2025-08-05 PROCEDURE — 2500000004 HC RX 250 GENERAL PHARMACY W/ HCPCS (ALT 636 FOR OP/ED): Mod: TB | Performed by: STUDENT IN AN ORGANIZED HEALTH CARE EDUCATION/TRAINING PROGRAM

## 2025-08-05 PROCEDURE — 97530 THERAPEUTIC ACTIVITIES: CPT | Mod: GP

## 2025-08-05 PROCEDURE — 72080 X-RAY EXAM THORACOLMB 2/> VW: CPT

## 2025-08-05 PROCEDURE — 82947 ASSAY GLUCOSE BLOOD QUANT: CPT

## 2025-08-05 PROCEDURE — 85027 COMPLETE CBC AUTOMATED: CPT | Performed by: STUDENT IN AN ORGANIZED HEALTH CARE EDUCATION/TRAINING PROGRAM

## 2025-08-05 PROCEDURE — 80048 BASIC METABOLIC PNL TOTAL CA: CPT | Performed by: STUDENT IN AN ORGANIZED HEALTH CARE EDUCATION/TRAINING PROGRAM

## 2025-08-05 PROCEDURE — 2500000001 HC RX 250 WO HCPCS SELF ADMINISTERED DRUGS (ALT 637 FOR MEDICARE OP): Performed by: STUDENT IN AN ORGANIZED HEALTH CARE EDUCATION/TRAINING PROGRAM

## 2025-08-05 PROCEDURE — 97116 GAIT TRAINING THERAPY: CPT | Mod: GP

## 2025-08-05 PROCEDURE — 36415 COLL VENOUS BLD VENIPUNCTURE: CPT | Performed by: STUDENT IN AN ORGANIZED HEALTH CARE EDUCATION/TRAINING PROGRAM

## 2025-08-05 PROCEDURE — 1100000001 HC PRIVATE ROOM DAILY

## 2025-08-05 PROCEDURE — 2500000002 HC RX 250 W HCPCS SELF ADMINISTERED DRUGS (ALT 637 FOR MEDICARE OP, ALT 636 FOR OP/ED): Performed by: STUDENT IN AN ORGANIZED HEALTH CARE EDUCATION/TRAINING PROGRAM

## 2025-08-05 PROCEDURE — 97165 OT EVAL LOW COMPLEX 30 MIN: CPT | Mod: GO

## 2025-08-05 PROCEDURE — 97162 PT EVAL MOD COMPLEX 30 MIN: CPT | Mod: GP

## 2025-08-05 RX ADMIN — ENALAPRIL MALEATE 20 MG: 20 TABLET ORAL at 08:18

## 2025-08-05 RX ADMIN — ACETAMINOPHEN 650 MG: 325 TABLET ORAL at 05:06

## 2025-08-05 RX ADMIN — ATENOLOL 25 MG: 25 TABLET ORAL at 08:17

## 2025-08-05 RX ADMIN — OXYCODONE HYDROCHLORIDE 10 MG: 5 TABLET ORAL at 02:32

## 2025-08-05 RX ADMIN — OXYCODONE HYDROCHLORIDE 10 MG: 5 TABLET ORAL at 08:25

## 2025-08-05 RX ADMIN — INSULIN LISPRO 1 UNITS: 100 INJECTION, SOLUTION INTRAVENOUS; SUBCUTANEOUS at 17:06

## 2025-08-05 RX ADMIN — SENNOSIDES, DOCUSATE SODIUM 2 TABLET: 50; 8.6 TABLET, FILM COATED ORAL at 20:30

## 2025-08-05 RX ADMIN — HEPARIN SODIUM 5000 UNITS: 5000 INJECTION, SOLUTION INTRAVENOUS; SUBCUTANEOUS at 15:46

## 2025-08-05 RX ADMIN — OXYCODONE HYDROCHLORIDE 10 MG: 5 TABLET ORAL at 20:30

## 2025-08-05 RX ADMIN — SENNOSIDES, DOCUSATE SODIUM 2 TABLET: 50; 8.6 TABLET, FILM COATED ORAL at 08:17

## 2025-08-05 RX ADMIN — POLYETHYLENE GLYCOL 3350 17 G: 17 POWDER, FOR SOLUTION ORAL at 08:17

## 2025-08-05 RX ADMIN — OXYCODONE HYDROCHLORIDE 10 MG: 5 TABLET ORAL at 12:30

## 2025-08-05 RX ADMIN — HEPARIN SODIUM 5000 UNITS: 5000 INJECTION, SOLUTION INTRAVENOUS; SUBCUTANEOUS at 08:17

## 2025-08-05 RX ADMIN — HYDROMORPHONE HYDROCHLORIDE 0.2 MG: 1 INJECTION, SOLUTION INTRAMUSCULAR; INTRAVENOUS; SUBCUTANEOUS at 05:06

## 2025-08-05 RX ADMIN — RAMELTEON 8 MG: 8 TABLET ORAL at 20:30

## 2025-08-05 RX ADMIN — ACETAMINOPHEN 650 MG: 325 TABLET ORAL at 17:06

## 2025-08-05 ASSESSMENT — PAIN SCALES - GENERAL
PAINLEVEL_OUTOF10: 7
PAINLEVEL_OUTOF10: 3
PAINLEVEL_OUTOF10: 5 - MODERATE PAIN
PAINLEVEL_OUTOF10: 7
PAINLEVEL_OUTOF10: 3
PAINLEVEL_OUTOF10: 7
PAINLEVEL_OUTOF10: 5 - MODERATE PAIN
PAINLEVEL_OUTOF10: 4
PAINLEVEL_OUTOF10: 8
PAINLEVEL_OUTOF10: 8

## 2025-08-05 ASSESSMENT — PAIN - FUNCTIONAL ASSESSMENT
PAIN_FUNCTIONAL_ASSESSMENT: 0-10
PAIN_FUNCTIONAL_ASSESSMENT: UNABLE TO SELF-REPORT
PAIN_FUNCTIONAL_ASSESSMENT: 0-10

## 2025-08-05 ASSESSMENT — COGNITIVE AND FUNCTIONAL STATUS - GENERAL
MOBILITY SCORE: 15
MOVING TO AND FROM BED TO CHAIR: A LOT
DAILY ACTIVITIY SCORE: 17
STANDING UP FROM CHAIR USING ARMS: A LITTLE
STANDING UP FROM CHAIR USING ARMS: A LITTLE
DAILY ACTIVITIY SCORE: 22
CLIMB 3 TO 5 STEPS WITH RAILING: A LITTLE
PERSONAL GROOMING: A LITTLE
WALKING IN HOSPITAL ROOM: A LITTLE
DRESSING REGULAR LOWER BODY CLOTHING: A LITTLE
WALKING IN HOSPITAL ROOM: A LITTLE
CLIMB 3 TO 5 STEPS WITH RAILING: A LITTLE
MOBILITY SCORE: 20
DRESSING REGULAR LOWER BODY CLOTHING: A LOT
DRESSING REGULAR UPPER BODY CLOTHING: A LITTLE
TOILETING: A LITTLE
HELP NEEDED FOR BATHING: A LITTLE
TURNING FROM BACK TO SIDE WHILE IN FLAT BAD: A LOT
MOVING FROM LYING ON BACK TO SITTING ON SIDE OF FLAT BED WITH BEDRAILS: A LOT
TOILETING: A LOT
MOVING TO AND FROM BED TO CHAIR: A LITTLE

## 2025-08-05 ASSESSMENT — ACTIVITIES OF DAILY LIVING (ADL): LACK_OF_TRANSPORTATION: NO

## 2025-08-05 NOTE — HOSPITAL COURSE
Jared Cox is a 64 y.o. male with a past medical history of  PMHX includes anxiety, chronic pain, CKD, GERD, hiatal hernia, HLD, HTN, insomnia, obesity and DM2.   On 8/4 he was taken to the OR for T11-L1 transpedicular decompression; T10-L2 posterior fusion secondary to Spondylosis with myelopathy of thoracic region. He was admitted to Neurosurgery service post operatively.     8/5 urpight XR completed with hardware in position.       PT/OT evaluated patient and recommended Rehab     On the day of discharge, the patient was seen and evaluated by the neurosurgery team and deemed suitable for discharge. The patient was given detailed discharge instructions and were scheduled to follow up as an outpatient.

## 2025-08-05 NOTE — CARE PLAN
The patient's goals for the shift include pt will remain safe and free of injuryt    The clinical goals for the shift include pt will remain HDS

## 2025-08-05 NOTE — PROGRESS NOTES
Physical Therapy    Physical Therapy Evaluation & Treatment    Patient Name: Jared Cox  MRN: [id number]  Department: [place] [address]  Room: 6027/6027-A  Today's Date: 8/5/2025   Time Calculation  Start Time: 1031  Stop Time: 1109  Time Calculation (min): 38 min    Assessment/Plan   PT Assessment  PT Assessment Results: Decreased strength, Decreased endurance, Impaired balance, Decreased mobility, Orthopedic restrictions  Rehab Prognosis: Excellent  Barriers to Discharge Home: Caregiver assistance, Physical needs  Caregiver Assistance: Caregiver assistance needed per identified barriers - however, level of patient's required assistance exceeds assistance available at home  Physical Needs: Intermittent mobility assistance needed, High falls risk due to function or environment  End of Session Communication: Bedside nurse  Assessment Comment: Pt is a 64 y.o male s/p T11-L1 laminectomies; right T12 & L1 transpedicular decompression; T11-L2 instrumented posterolateral fusion on 8/4/25 who presents to PT with decreased strength, balance, mobility, and endurance.  Pt is independent without AD at baseline but currently requires Mod A for bed mobility, CGA-Mod A for transfers with RW, and supervision for amb with RW.  Pt will benefit from skilled PT to address the above deficits and will benefit from high intensity PT upon discharge but may progress to low pending progress with PT.  End of Session Patient Position: Up in chair, Alarm on   IP OR SWING BED PT PLAN  Inpatient or Swing Bed: Inpatient  PT Plan  Treatment/Interventions: Bed mobility, Transfer training, Gait training, Stair training, Balance training, Neuromuscular re-education, Strengthening, Endurance training, Therapeutic exercise, Therapeutic activity, Home exercise program  PT Plan: Ongoing PT  PT Frequency: Daily  PT Discharge Recommendations: High intensity level of continued care (may progress to low pending progress with PT)  Equipment Recommended upon Discharge:  Wheeled walker  PT Recommended Transfer Status: Assist x1, Assistive device (RW)  PT - OK to Discharge: Yes (eval complete and D/C recommendations made)      Subjective     PT Visit Info:  PT Received On: 08/05/25  General Visit Information:  General  Reason for Referral: S/p T11-L1 laminectomies; right T12 & L1 transpedicular decompression; T11-L2 instrumented posterolateral fusion on 8/4/25  Past Medical History Relevant to Rehab: Per chart, PMH includes  anxiety, chronic pain, CKD, GERD, hiatal hernia, HLD, HTN, insomnia, obesity, DM2, neurogenic claudication, myalgia, lumbar spinal stenosis, and lumbar spondylosis with myelopathy  Prior to Session Communication: Bedside nurse  Patient Position Received: Bed, 3 rail up, Alarm on  General Comment: Pt cleared for PT by RN.  Pt alert and agreeable to PT. 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[facility])  Prior Function Comments: +driving, denies falls but reports LOB backwards onto bed when standing occasionally  Precautions:  Precautions  Medical Precautions: Fall precautions, Spinal precautions  Post-Surgical Precautions: Spinal precautions     Date/Time Vitals Session Patient Position Pulse Resp SpO2 BP MAP (mmHg)    08/05/25 1031 Pre PT  Lying  104  --  94 %  --  --     08/05/25 1100 During OT  --  109  --  93 %  --  --     08/05/25 1109 Post PT  Sitting  105  --  94 %  --  --         Objective   Pain:  Pain Assessment  Pain Assessment: 0-10  0-10 (Numeric) Pain Score: 4  Pain Type: Surgical pain  Pain Location: Back  Pain Interventions: Ambulation/increased activity, Repositioned, Rest  Response to  Interventions: Increase in pain, Decrease in pain (increased to 10/10 with bed mobility and transfers, decreased to 2/10 ambulating, and 3/10 at end of session)  Cognition:  Cognition  Overall Cognitive Status: Within Functional Limits  Orientation Level: Oriented X4    General Assessments:  Activity Tolerance  Endurance: Tolerates 10 - 20 min exercise with multiple rests    Sensation  Light Touch: No apparent deficits    Strength  Strength Comments: B LE strength grossly 4+/5  Coordination  Movements are Fluid and Coordinated: Yes    Postural Control  Postural Control: Within Functional Limits (forward flexed in standing)    Static Sitting Balance  Static Sitting-Balance Support: Bilateral upper extremity supported, Feet supported  Static Sitting-Level of Assistance: Close supervision  Dynamic Sitting Balance  Dynamic Sitting-Balance Support: Bilateral upper extremity supported, Feet supported  Dynamic Sitting-Level of Assistance: Close supervision  Dynamic Sitting-Balance: Forward lean    Static Standing Balance  Static Standing-Balance Support: Bilateral upper extremity supported (RW)  Static Standing-Level of Assistance: Close supervision  Dynamic Standing Balance  Dynamic Standing-Balance Support: Bilateral upper extremity supported (RW)  Dynamic Standing-Level of Assistance: Close supervision  Dynamic Standing-Balance: Turning  Functional Assessments:  Bed Mobility  Bed Mobility: Yes  Bed Mobility 1  Bed Mobility 1: Rolling right  Level of Assistance 1: Moderate assistance  Bed Mobility Comments 1: HOB partially elevated to mimic home set up, heavy use of bed rail  Bed Mobility 2  Bed Mobility  2: Side lying right to sit  Level of Assistance 2: Moderate assistance  Bed Mobility Comments 2: HOB partially elevated to mimic home set up  Bed Mobility 3  Bed Mobility 3: Scooting (forward at EOB)  Level of Assistance 3: Close supervision    Transfers  Transfer: Yes  Transfer 1  Transfer From 1: Bed to  Transfer to  "1: Stand  Technique 1: Sit to stand  Transfer Device 1: Walker  Transfer Level of Assistance 1: Moderate assistance  Trials/Comments 1: VCs for hand placement  Transfers 2  Transfer From 2: Toilet to  Transfer to 2: Stand  Technique 2: Stand to sit, Sit to stand  Transfer Device 2: Walker (L grab bar)  Transfer Level of Assistance 2: Minimum assistance  Trials/Comments 2: VCs for hand placement  Transfers 3  Transfer From 3: Chair with arms to  Transfer to 3: Stand  Technique 3: Stand to sit, Sit to stand  Transfer Device 3: Walker  Transfer Level of Assistance 3: Contact guard  Trials/Comments 3: VCs for hand placement    Ambulation/Gait Training  Ambulation/Gait Training Performed: Yes  Ambulation/Gait Training 1  Surface 1: Level tile  Device 1: Rolling walker  Assistance 1: Close supervision  Quality of Gait 1: Forward flexed posture, Decreased step length, Inconsistent stride length, Diminished heel strike (decreased gait speed)  Comments/Distance (ft) 1: 275ft  Treatments:  Therapeutic Activity  Therapeutic Activity Performed: Yes  Therapeutic Activity 1: transfers, sitting and standing balance, and pt edu on spine precautions and HEP: ankle pumps, heel slides, LAQx3\" hold 10x each LE, 4x/day    Ambulation/Gait Training  Ambulation/Gait Training Performed: Yes  Ambulation/Gait Training 1  Surface 1: Level tile  Device 1: Rolling walker  Assistance 1: Close supervision  Quality of Gait 1: Forward flexed posture, Decreased step length, Inconsistent stride length, Diminished heel strike (decreased gait speed)  Comments/Distance (ft) 1: 275ft  Outcome Measures:  [facility] Basic Mobility  Turning from your back to your side while in a flat bed without using bedrails: A lot  Moving from lying on your back to sitting on the side of a flat bed without using bedrails: A lot  Moving to and from bed to chair (including a wheelchair): A lot  Standing up from a chair using your arms (e.g. wheelchair or bedside chair): A " little  To walk in hospital room: A little  Climbing 3-5 steps with railing: A little  Basic Mobility - Total Score: 15    Encounter Problems       Encounter Problems (Active)       Balance       Pt will score >/=24 on the Tinetti to indicate low fall risk       Start:  08/05/25    Expected End:  08/19/25               Mobility       Pt will complete bed mobility independently via log roll technique with HOB elevated to mimic home set up and no use of bed rails        Start:  08/05/25    Expected End:  08/19/25            Pt will amb >200ft with RW and modified independence in prep for safe discharge home        Start:  08/05/25    Expected End:  08/19/25               PT Transfers       Pt will transfer sit to stand with RW and modified independence in prep for out of bed mobility        Start:  08/05/25    Expected End:  08/19/25                   Education Documentation  Precautions, taught by Essence Dukes V PT at 8/5/2025 11:44 AM.  Learner: Patient  Readiness: Acceptance  Method: Explanation  Response: Verbalizes Understanding  Comment: spine and fall precautions    Body Mechanics, taught by Essence Dukes V PT at 8/5/2025 11:44 AM.  Learner: Patient  Readiness: Acceptance  Method: Explanation  Response: Verbalizes Understanding  Comment: spine and fall precautions    Home Exercise Program, taught by Essence Dukes V PT at 8/5/2025 11:44 AM.  Learner: Patient  Readiness: Acceptance  Method: Explanation  Response: Verbalizes Understanding  Comment: spine and fall precautions    Mobility Training, taught by Essence Dukes V PT at 8/5/2025 11:44 AM.  Learner: Patient  Readiness: Acceptance  Method: Explanation  Response: Verbalizes Understanding  Comment: spine and fall precautions    Education Comments  No comments found.

## 2025-08-05 NOTE — PROGRESS NOTES
Occupational Therapy    Evaluation    Patient Name: Jared Cox  MRN: [id number]  Department: [place] [id number]  Room: [facility]-A  Today's Date: 8/5/2025  Time Calculation  Start Time: 0848  Stop Time: 0906  Time Calculation (min): 18 min    Assessment  IP OT Assessment  OT Assessment: difficulty I/ADLs, safety, fxnl mob  Prognosis: Good  Barriers to Discharge Home: Physical needs  Physical Needs: 24hr mobility assistance needed, 24hr ADL assistance needed, High falls risk due to function or environment  Evaluation/Treatment Tolerance: Patient tolerated treatment well  Medical Staff Made Aware: Yes  End of Session Communication: Bedside nurse  End of Session Patient Position: Bed, 3 rail up, Alarm on  Plan:  Treatment Interventions: ADL retraining, Functional transfer training, Endurance training, Patient/family training, Equipment evaluation/education, Compensatory technique education  OT Frequency: 3 times per week (acute care stay)  OT Discharge Recommendations: High intensity level of continued care (Based on current functional status and rehab potential, patient is anticipated to tolerate and benefit from 5 or more days per week of skilled rehabilitative therapy after discharge from this acute inpatient hospitalization.)  Equipment Recommended upon Discharge: Wheeled walker (shower chair, hip kit)  OT Recommended Transfer Status: Assist of 1  OT - OK to Discharge: Yes    Subjective   Current Problem:  1. Spondylosis with myelopathy, thoracic region  Full code    Insert and maintain peripheral IV    Saline lock IV    Type And Screen    povidone-iodine 5 % kit kit    Admit to inpatient    Full code    Insert and maintain peripheral IV    Saline lock IV    Admit to inpatient    CANCELED: NPO Diet Except: Sips with meds; Effective now    CANCELED: Height and weight    CANCELED: Apply sequential compression device    CANCELED: Apply MYCHAL hose    CANCELED: NPO Diet Except: Sips with meds; Effective now    CANCELED: Height and  weight    CANCELED: Apply sequential compression device    CANCELED: Apply MYCHAL hose      2. Spinal stenosis of lumbar region, unspecified whether neurogenic claudication present  Full code    Insert and maintain peripheral IV    Saline lock IV    Type And Screen    povidone-iodine 5 % kit kit    Admit to inpatient    Full code    Insert and maintain peripheral IV    Saline lock IV    Admit to inpatient    CANCELED: NPO Diet Except: Sips with meds; Effective now    CANCELED: Height and weight    CANCELED: Apply sequential compression device    CANCELED: Apply MYCHAL hose    CANCELED: NPO Diet Except: Sips with meds; Effective now    CANCELED: Height and weight    CANCELED: Apply sequential compression device    CANCELED: Apply MYCHAL hose      3. Neurogenic claudication  Full code    Insert and maintain peripheral IV    Saline lock IV    Type And Screen    povidone-iodine 5 % kit kit    Admit to inpatient    Full code    Insert and maintain peripheral IV    Saline lock IV    Admit to inpatient    CANCELED: NPO Diet Except: Sips with meds; Effective now    CANCELED: Height and weight    CANCELED: Apply sequential compression device    CANCELED: Apply MYCHAL hose    CANCELED: NPO Diet Except: Sips with meds; Effective now    CANCELED: Height and weight    CANCELED: Apply sequential compression device    CANCELED: Apply MYCHAL hose      4. Spondylolisthesis at L5-S1 level  Full code    Insert and maintain peripheral IV    Saline lock IV    Type And Screen    povidone-iodine 5 % kit kit    Admit to inpatient    Full code    Insert and maintain peripheral IV    Saline lock IV    Admit to inpatient    CANCELED: NPO Diet Except: Sips with meds; Effective now    CANCELED: Height and weight    CANCELED: Apply sequential compression device    CANCELED: Apply MYCHAL hose    CANCELED: NPO Diet Except: Sips with meds; Effective now    CANCELED: Height and weight    CANCELED: Apply sequential compression device    CANCELED: Apply MYCHAL hose         OT Visit Info:  OT Received On: 08/05/25  General Visit Info:  General  Reason for Referral: T11-L1 transpedicular decompression, T11-L2 fusion  Past Medical History Relevant to Rehab: Spondylosis with myelopathy, thoracic region     HTN (hypertension)     Hiatal hernia     Diabetes mellitus, type 2 (Multi)     Spinal stenosis of lumbar region     Neurogenic claudication     Spondylolisthesis at L5-S1 level  Family/Caregiver Present: No  Prior to Session Communication: Bedside nurse  Patient Position Received: Up in chair, Alarm off, not on at start of session  General Comment: pt sleepy in session reports recent pain meds RN notified  Precautions:  Medical Precautions: Fall precautions  Post-Surgical Precautions: Spinal precautions     Date/Time Vitals Session Patient Position Pulse Resp SpO2 BP MAP (mmHg)    08/05/25 1031 Pre PT  Lying  104  --  94 %  --  --     08/05/25 1100 During OT  --  109  --  93 %  --  --     08/05/25 1109 Post PT  Sitting  105  --  94 %  --  --           Pain:  Pain Assessment  Pain Assessment: 0-10  0-10 (Numeric) Pain Score: 3  Pain Location: Back    Objective   Cognition:  Overall Cognitive Status: Within Functional Limits  Orientation Level: Oriented X4  Insight: Mild           Home Living:  Type of Home: Condo  Lives With:  (alone, spouse to stay with him for a week post d/c)  Home Adaptive Equipment:  (hospital bed)  Home Layout: One level  Home Access: Level entry  Bathroom Shower/Tub: Walk-in shower  Bathroom Toilet: Standard   Prior Function:  Level of [place]: Independent with ADLs and functional transfers, Independent with homemaking with ambulation  Ambulatory Assistance: Independent  Leisure: cat, birds  Hand Dominance: Right  IADL History:  IADL Comments: I I/ADLS, +drive, +falls  ADL:  Eating Assistance: Independent  Grooming Assistance:  (CGA anticipated standing WHW)  Bathing Assistance:  (min  A anticipated AE)  UE Dressing Assistance: Minimal  LE Dressing  Assistance: Moderate (adjust socks seated)  Toileting Assistance with Device:  (mod A anticipated WHW)  Functional Deficit: Setup, Steadying, Verbal cueing, Supervision/safety, Increased time to complete  Activity Tolerance:  Endurance:  (fair)  Bed Mobility/Transfers: Bed Mobility  Bed Mobility:  (sit to sup mod A log roll)    Transfers  Transfer:  (sit to stand mod A stand to sit min A WHW)      Functional Mobility:  Functional Mobility  Functional Mobility Performed:  (pt performed fxnl mob chair to bed min A WHW cues for safety)  Sitting Balance:  Dynamic Sitting Balance  Dynamic Sitting-Level of Assistance: Contact guard  Standing Balance:  Dynamic Standing Balance  Dynamic Standing-Level of Assistance: Minimum assistance    IADL's:   IADL Comments: I I/ADLS, +drive, +falls  Vision: Vision - Basic Assessment  Current Vision: Wears glasses all the time  Sensation:  Light Touch:  (reports N/T better BLE post op)  Strength:  Strength Comments: BUE 4+/5  WFL       Coordination:  Movements are Fluid and Coordinated: Yes   Hand Function:  Hand Function  Gross Grasp: Functional  Extremities: RUE   RUE : Within Functional Limits and LUE   LUE: Within Functional Limits      Outcome Measures: [facility] Daily Activity  Putting on and taking off regular lower body clothing: A lot  Bathing (including washing, rinsing, drying): A little  Putting on and taking off regular upper body clothing: A little  Toileting, which includes using toilet, bedpan or urinal: A lot  Taking care of personal grooming such as brushing teeth: A little  Eating Meals: None  Daily Activity - Total Score: 17         and OT Adult Other Outcome Measures  4AT: -  Modified Barthel Index (Davenport version): Modified Barthel Index (Davenport et al.) = 49 points   Interpretation: 21-60 Severe dependency  Education Documentation  Handouts, taught by Loulou Anderson OT at 8/5/2025 11:42 AM.  Learner: Patient  Readiness: Acceptance  Method: Explanation,  Demonstration, Handout  Response: Verbalizes Understanding, Needs Reinforcement  Comment: spine prec log roll I/ADLS    Body Mechanics, taught by Loulou Anderson OT at 8/5/2025 11:42 AM.  Learner: Patient  Readiness: Acceptance  Method: Explanation, Demonstration, Handout  Response: Verbalizes Understanding, Needs Reinforcement  Comment: spine prec log roll I/ADLS    Precautions, taught by Loulou Anderson OT at 8/5/2025 11:42 AM.  Learner: Patient  Readiness: Acceptance  Method: Explanation, Demonstration, Handout  Response: Verbalizes Understanding, Needs Reinforcement  Comment: spine prec log roll I/ADLS    ADL Training, taught by Loulou Anderson OT at 8/5/2025 11:42 AM.  Learner: Patient  Readiness: Acceptance  Method: Explanation, Demonstration, Handout  Response: Verbalizes Understanding, Needs Reinforcement  Comment: spine prec log roll I/ADLS    Education Comments  No comments found.      Goals:   Encounter Problems       Encounter Problems (Active)       ADLs       Patient will perform UB and LB bathing  with modified independent level of assistance and ae. (Progressing)       Start:  08/05/25    Expected End:  08/26/25            Patient with complete upper body dressing with independent level of assistance  (Progressing)       Start:  08/05/25    Expected End:  08/26/25            Patient with complete lower body dressing with modified independent level of assistance donning and doffing all LE clothes  with PRN adaptive equipment  (Progressing)       Start:  08/05/25    Expected End:  08/26/25            Patient will complete daily grooming tasks  with independent level of assistance  (Progressing)       Start:  08/05/25    Expected End:  08/26/25            Patient will complete toileting including hygiene clothing management/hygiene with modified independent level of assistance and lrd. (Progressing)       Start:  08/05/25    Expected End:  08/26/25               COGNITION/SAFETY       Patient will  recall and adhere to  precautions during all functional mobility/ADL tasks in order to demonstrate improved understanding and promote healing post op (Progressing)       Start:  08/05/25    Expected End:  08/26/25               MOBILITY       Patient will perform Functional mobility max Household distances/Community Distances with contact guard assist level of assistance and least restrictive device in order to improve safety and functional mobility. (Progressing)       Start:  08/05/25    Expected End:  08/26/25               TRANSFERS       Patient will perform bed mobility independent level of assistance  (Progressing)       Start:  08/05/25    Expected End:  08/26/25            Patient will complete functional transferleast restrictive device with contact guard assist level of assistance. (Progressing)       Start:  08/05/25    Expected End:  08/26/25

## 2025-08-05 NOTE — PROGRESS NOTES
08/05/25 0800   Discharge Planning   Living Arrangements Spouse/significant other   Support Systems Spouse/significant other   Assistance Needed None   Type of Residence Private residence   Number of Stairs to Enter Residence 0   Number of Stairs Within Residence 0   Do you have animals or pets at home? Yes   Type of Animals or Pets Cat   Who is requesting discharge planning? Provider   Home or Post Acute Services None   Expected Discharge Disposition Home   Does the patient need discharge transport arranged? No   Financial Resource Strain   How hard is it for you to pay for the very basics like food, housing, medical care, and heating? Not hard   Housing Stability   In the last 12 months, was there a time when you were not able to pay the mortgage or rent on time? N   In the past 12 months, how many times have you moved where you were living? 1   At any time in the past 12 months, were you homeless or living in a shelter (including now)? N   Transportation Needs   In the past 12 months, has lack of transportation kept you from medical appointments or from getting medications? no   In the past 12 months, has lack of transportation kept you from meetings, work, or from getting things needed for daily living? No   Stroke Family Assessment   Stroke Family Assessment Needed No   Intensity of Service   Intensity of Service 0-30 min     I met with Jared at the bedside regarding discharge planning and home going needs. Patient states that he goes back and forth between two residences, he states that he will be discharge to his residence in [place] where he is independent with ADL's without assistive devices. Patient is pending drain removal and therapy recommendations for discharge. I will continue to follow with a safe discharge plan.

## 2025-08-05 NOTE — CARE PLAN
The patient's goals for the shift include  have a bowel movement    The clinical goals for the shift include remain free from falls

## 2025-08-05 NOTE — PROGRESS NOTES
"Jared Cox is a 64 y.o. male on day 1 of admission presenting with Spondylosis with myelopathy, thoracic region.    Subjective   No acute events overnight, pain is well managed, patient has been up and walking, dawn was removed and patient voided without issue       Objective     Physical Exam  Constitutional: No acute distress  Resp: breathing comfortably on room air  Neuro: Awake, Ox3  face symmetric  RUE 5/5  LUE 5/5  RLE 5/5  LLE 5/5  Drain in place  sensation intact to light touch  Psych: appropriate    Last Recorded Vitals  Blood pressure 138/87, pulse 109, temperature 36.7 °C (98.1 °F), temperature source Temporal, resp. rate 16, height 1.727 m (5' 8\"), weight 114 kg (251 lb 15.8 oz), SpO2 93%.  Intake/Output last 3 Shifts:  I/O last 3 completed shifts:  In: 1550 (13.6 mL/kg) [I.V.:1050 (9.2 mL/kg); IV Piggyback:500]  Out: 4180 (36.6 mL/kg) [Urine:2265 (0.6 mL/kg/hr); Drains:215; Blood:1700]  Weight: 114.3 kg     Relevant Results                            Assessment & Plan  Spondylosis with myelopathy, thoracic region    HTN (hypertension)    Hiatal hernia    Diabetes mellitus, type 2 (Multi)    Spinal stenosis of lumbar region    Neurogenic claudication    Spondylolisthesis at L5-S1 level      Jared Cox is a 64 year old with h/o HTN, HLD, DM2, CKD3, GERD, psoriasis, p/w thoracic myelopathy, 8/4 s/p T11-L1 transpedicular decompression, T11-L2 fusion    Plan  Floor  Maintain drain  Uprights today  PTOT          Charli Rain MD    "

## 2025-08-06 LAB
ANION GAP SERPL CALC-SCNC: 13 MMOL/L (ref 10–20)
BUN SERPL-MCNC: 11 MG/DL (ref 6–23)
CALCIUM SERPL-MCNC: 8 MG/DL (ref 8.6–10.6)
CHLORIDE SERPL-SCNC: 100 MMOL/L (ref 98–107)
CO2 SERPL-SCNC: 24 MMOL/L (ref 21–32)
CREAT SERPL-MCNC: 0.88 MG/DL (ref 0.5–1.3)
EGFRCR SERPLBLD CKD-EPI 2021: >90 ML/MIN/1.73M*2
ERYTHROCYTE [DISTWIDTH] IN BLOOD BY AUTOMATED COUNT: 13.9 % (ref 11.5–14.5)
GLUCOSE BLD MANUAL STRIP-MCNC: 142 MG/DL (ref 74–99)
GLUCOSE BLD MANUAL STRIP-MCNC: 147 MG/DL (ref 74–99)
GLUCOSE BLD MANUAL STRIP-MCNC: 155 MG/DL (ref 74–99)
GLUCOSE BLD MANUAL STRIP-MCNC: 165 MG/DL (ref 74–99)
GLUCOSE SERPL-MCNC: 145 MG/DL (ref 74–99)
HCT VFR BLD AUTO: 32.4 % (ref 41–52)
HGB BLD-MCNC: 10.3 G/DL (ref 13.5–17.5)
MCH RBC QN AUTO: 29.1 PG (ref 26–34)
MCHC RBC AUTO-ENTMCNC: 31.8 G/DL (ref 32–36)
MCV RBC AUTO: 92 FL (ref 80–100)
NRBC BLD-RTO: 0 /100 WBCS (ref 0–0)
PLATELET # BLD AUTO: 130 X10*3/UL (ref 150–450)
POTASSIUM SERPL-SCNC: 3.8 MMOL/L (ref 3.5–5.3)
RBC # BLD AUTO: 3.54 X10*6/UL (ref 4.5–5.9)
SODIUM SERPL-SCNC: 133 MMOL/L (ref 136–145)
WBC # BLD AUTO: 12.7 X10*3/UL (ref 4.4–11.3)

## 2025-08-06 PROCEDURE — 80048 BASIC METABOLIC PNL TOTAL CA: CPT | Performed by: STUDENT IN AN ORGANIZED HEALTH CARE EDUCATION/TRAINING PROGRAM

## 2025-08-06 PROCEDURE — 85027 COMPLETE CBC AUTOMATED: CPT | Performed by: STUDENT IN AN ORGANIZED HEALTH CARE EDUCATION/TRAINING PROGRAM

## 2025-08-06 PROCEDURE — 82947 ASSAY GLUCOSE BLOOD QUANT: CPT

## 2025-08-06 PROCEDURE — 2500000001 HC RX 250 WO HCPCS SELF ADMINISTERED DRUGS (ALT 637 FOR MEDICARE OP): Performed by: STUDENT IN AN ORGANIZED HEALTH CARE EDUCATION/TRAINING PROGRAM

## 2025-08-06 PROCEDURE — 2500000001 HC RX 250 WO HCPCS SELF ADMINISTERED DRUGS (ALT 637 FOR MEDICARE OP)

## 2025-08-06 PROCEDURE — 2500000004 HC RX 250 GENERAL PHARMACY W/ HCPCS (ALT 636 FOR OP/ED): Performed by: STUDENT IN AN ORGANIZED HEALTH CARE EDUCATION/TRAINING PROGRAM

## 2025-08-06 PROCEDURE — 2500000002 HC RX 250 W HCPCS SELF ADMINISTERED DRUGS (ALT 637 FOR MEDICARE OP, ALT 636 FOR OP/ED): Performed by: STUDENT IN AN ORGANIZED HEALTH CARE EDUCATION/TRAINING PROGRAM

## 2025-08-06 PROCEDURE — 1100000001 HC PRIVATE ROOM DAILY

## 2025-08-06 PROCEDURE — 36415 COLL VENOUS BLD VENIPUNCTURE: CPT | Performed by: STUDENT IN AN ORGANIZED HEALTH CARE EDUCATION/TRAINING PROGRAM

## 2025-08-06 RX ORDER — OXYCODONE HYDROCHLORIDE 5 MG/1
5 TABLET ORAL EVERY 6 HOURS PRN
Status: CANCELLED
Start: 2025-08-06 | End: 2025-08-13

## 2025-08-06 RX ORDER — METHOCARBAMOL 750 MG/1
750 TABLET, FILM COATED ORAL 3 TIMES DAILY PRN
Qty: 21 TABLET | Refills: 0 | Status: CANCELLED | OUTPATIENT
Start: 2025-08-06 | End: 2025-08-13

## 2025-08-06 RX ORDER — AMOXICILLIN 250 MG
2 CAPSULE ORAL 2 TIMES DAILY
Status: CANCELLED
Start: 2025-08-06

## 2025-08-06 RX ORDER — CALCIUM CARBONATE 1250 MG/5ML
1250 SUSPENSION ORAL
Status: DISCONTINUED | OUTPATIENT
Start: 2025-08-06 | End: 2025-08-08 | Stop reason: HOSPADM

## 2025-08-06 RX ORDER — CYCLOBENZAPRINE HCL 10 MG
10 TABLET ORAL 3 TIMES DAILY PRN
Status: CANCELLED
Start: 2025-08-06

## 2025-08-06 RX ORDER — HEPARIN SODIUM 5000 [USP'U]/ML
5000 INJECTION, SOLUTION INTRAVENOUS; SUBCUTANEOUS EVERY 8 HOURS
Status: CANCELLED
Start: 2025-08-06

## 2025-08-06 RX ORDER — CALCIUM CARBONATE 500(1250)
1250 TABLET ORAL
Status: DISCONTINUED | OUTPATIENT
Start: 2025-08-06 | End: 2025-08-08 | Stop reason: HOSPADM

## 2025-08-06 RX ORDER — POLYETHYLENE GLYCOL 3350 17 G/17G
17 POWDER, FOR SOLUTION ORAL DAILY
Status: CANCELLED
Start: 2025-08-07

## 2025-08-06 RX ADMIN — INSULIN LISPRO 1 UNITS: 100 INJECTION, SOLUTION INTRAVENOUS; SUBCUTANEOUS at 11:49

## 2025-08-06 RX ADMIN — ATENOLOL 25 MG: 25 TABLET ORAL at 08:39

## 2025-08-06 RX ADMIN — HEPARIN SODIUM 5000 UNITS: 5000 INJECTION, SOLUTION INTRAVENOUS; SUBCUTANEOUS at 16:19

## 2025-08-06 RX ADMIN — POLYETHYLENE GLYCOL 3350 17 G: 17 POWDER, FOR SOLUTION ORAL at 08:39

## 2025-08-06 RX ADMIN — RAMELTEON 8 MG: 8 TABLET ORAL at 21:06

## 2025-08-06 RX ADMIN — OXYCODONE HYDROCHLORIDE 10 MG: 5 TABLET ORAL at 21:10

## 2025-08-06 RX ADMIN — SENNOSIDES, DOCUSATE SODIUM 2 TABLET: 50; 8.6 TABLET, FILM COATED ORAL at 21:06

## 2025-08-06 RX ADMIN — INSULIN LISPRO 1 UNITS: 100 INJECTION, SOLUTION INTRAVENOUS; SUBCUTANEOUS at 16:19

## 2025-08-06 RX ADMIN — HEPARIN SODIUM 5000 UNITS: 5000 INJECTION, SOLUTION INTRAVENOUS; SUBCUTANEOUS at 08:39

## 2025-08-06 RX ADMIN — ACETAMINOPHEN 650 MG: 325 TABLET ORAL at 11:02

## 2025-08-06 RX ADMIN — OXYCODONE HYDROCHLORIDE 10 MG: 5 TABLET ORAL at 14:02

## 2025-08-06 RX ADMIN — CALCIUM 1 TABLET: 500 TABLET ORAL at 14:40

## 2025-08-06 RX ADMIN — OXYCODONE HYDROCHLORIDE 10 MG: 5 TABLET ORAL at 00:32

## 2025-08-06 RX ADMIN — SENNOSIDES, DOCUSATE SODIUM 2 TABLET: 50; 8.6 TABLET, FILM COATED ORAL at 08:39

## 2025-08-06 RX ADMIN — OXYCODONE HYDROCHLORIDE 10 MG: 5 TABLET ORAL at 05:42

## 2025-08-06 RX ADMIN — ACETAMINOPHEN 650 MG: 325 TABLET ORAL at 05:42

## 2025-08-06 RX ADMIN — HEPARIN SODIUM 5000 UNITS: 5000 INJECTION, SOLUTION INTRAVENOUS; SUBCUTANEOUS at 00:17

## 2025-08-06 RX ADMIN — ACETAMINOPHEN 650 MG: 325 TABLET ORAL at 17:14

## 2025-08-06 RX ADMIN — ENALAPRIL MALEATE 20 MG: 20 TABLET ORAL at 08:39

## 2025-08-06 RX ADMIN — ACETAMINOPHEN 650 MG: 325 TABLET ORAL at 00:15

## 2025-08-06 ASSESSMENT — PAIN - FUNCTIONAL ASSESSMENT
PAIN_FUNCTIONAL_ASSESSMENT: 0-10
PAIN_FUNCTIONAL_ASSESSMENT: UNABLE TO SELF-REPORT

## 2025-08-06 ASSESSMENT — COGNITIVE AND FUNCTIONAL STATUS - GENERAL
MOVING TO AND FROM BED TO CHAIR: A LITTLE
DAILY ACTIVITIY SCORE: 22
MOBILITY SCORE: 20
CLIMB 3 TO 5 STEPS WITH RAILING: A LITTLE
STANDING UP FROM CHAIR USING ARMS: A LITTLE
TOILETING: A LITTLE
WALKING IN HOSPITAL ROOM: A LITTLE
DRESSING REGULAR LOWER BODY CLOTHING: A LITTLE

## 2025-08-06 ASSESSMENT — PAIN SCALES - GENERAL
PAINLEVEL_OUTOF10: 8
PAINLEVEL_OUTOF10: 7
PAINLEVEL_OUTOF10: 3
PAINLEVEL_OUTOF10: 5 - MODERATE PAIN
PAINLEVEL_OUTOF10: 5 - MODERATE PAIN
PAINLEVEL_OUTOF10: 8
PAINLEVEL_OUTOF10: 7

## 2025-08-06 NOTE — PROGRESS NOTES
"Jared Cox is a 64 y.o. male on day 2 of admission presenting with Spondylosis with myelopathy, thoracic region.    Subjective   No acute events overnight. Voiding and passing gas. No BM yet. No abdominal pain. Pain controlled. Can feel his feet and move them more.     Objective     Physical Exam  Constitutional: No acute distress  Resp: breathing comfortably on room air  Neuro: Awake, Ox3  face symmetric  RUE 5/5  LUE 5/5  RLE 5/5  LLE 5/5  sensation intact to light touch  Psych: appropriate  Incision c/d/I, staples in place   Drain in place     Last Recorded Vitals  Blood pressure 135/86, pulse 105, temperature 37.6 °C (99.7 °F), resp. rate 18, height 1.727 m (5' 8\"), weight 114 kg (251 lb 15.8 oz), SpO2 94%.  Intake/Output last 3 Shifts:  I/O last 3 completed shifts:  In: 1550 (13.6 mL/kg) [I.V.:1050 (9.2 mL/kg); IV Piggyback:500]  Out: 4555 (39.9 mL/kg) [Urine:2615 (0.6 mL/kg/hr); Drains:240; Blood:1700]  Weight: 114.3 kg     Relevant Results  Lab Results   Component Value Date    WBC 10.0 08/05/2025    HGB 10.7 (L) 08/05/2025    HCT 34.4 (L) 08/05/2025    MCV 93 08/05/2025     (L) 08/05/2025         Assessment & Plan  Spondylosis with myelopathy, thoracic region    HTN (hypertension)    Hiatal hernia    Diabetes mellitus, type 2 (Multi)    Spinal stenosis of lumbar region    Neurogenic claudication    Spondylolisthesis at L5-S1 level      Jared Cox is a 64 year old with h/o HTN, HLD, DM2, CKD3, GERD, psoriasis, p/w thoracic myelopathy, 8/4 s/p T11-L1 transpedicular decompression, T11-L2 fusion, 8/5 Uprights hardware in posn    Plan  Floor  Maintain drain, monitor output  AM labs  Continue to ambulate and get out of bed TID   PTOT-rehab          Julio Akhtar MD    "

## 2025-08-06 NOTE — CARE PLAN
The patient's goals for the shift include  pain control    The clinical goals for the shift include Remain HDS throughout shift

## 2025-08-06 NOTE — CARE PLAN
The patient's goals for the shift include pt will rate pain 5/10 or less -progressing    The clinical goals for the shift include pt will mobilze with PT/OT -pt mobilized with nursing this shift to chair

## 2025-08-06 NOTE — PROGRESS NOTES
Patient is medically cleared for discharge recommended High intensity therapy at discharge list of facility choices printed from [facility] for freedom of choice. Patient and his wife have chosen  [facility] and pre cert is started. I will continue to follow with a safe discharge plan.

## 2025-08-07 LAB
ANION GAP SERPL CALC-SCNC: 14 MMOL/L (ref 10–20)
BLOOD EXPIRATION DATE: NORMAL
BLOOD EXPIRATION DATE: NORMAL
BUN SERPL-MCNC: 11 MG/DL (ref 6–23)
CALCIUM SERPL-MCNC: 8.9 MG/DL (ref 8.6–10.6)
CHLORIDE SERPL-SCNC: 97 MMOL/L (ref 98–107)
CO2 SERPL-SCNC: 26 MMOL/L (ref 21–32)
CREAT SERPL-MCNC: 1 MG/DL (ref 0.5–1.3)
DISPENSE STATUS: NORMAL
DISPENSE STATUS: NORMAL
EGFRCR SERPLBLD CKD-EPI 2021: 84 ML/MIN/1.73M*2
ERYTHROCYTE [DISTWIDTH] IN BLOOD BY AUTOMATED COUNT: 13.9 % (ref 11.5–14.5)
GLUCOSE BLD MANUAL STRIP-MCNC: 148 MG/DL (ref 74–99)
GLUCOSE BLD MANUAL STRIP-MCNC: 191 MG/DL (ref 74–99)
GLUCOSE BLD MANUAL STRIP-MCNC: 193 MG/DL (ref 74–99)
GLUCOSE BLD MANUAL STRIP-MCNC: 195 MG/DL (ref 74–99)
GLUCOSE SERPL-MCNC: 139 MG/DL (ref 74–99)
HCT VFR BLD AUTO: 39.7 % (ref 41–52)
HGB BLD-MCNC: 12.6 G/DL (ref 13.5–17.5)
MCH RBC QN AUTO: 29.3 PG (ref 26–34)
MCHC RBC AUTO-ENTMCNC: 31.7 G/DL (ref 32–36)
MCV RBC AUTO: 92 FL (ref 80–100)
NRBC BLD-RTO: 0 /100 WBCS (ref 0–0)
PLATELET # BLD AUTO: 168 X10*3/UL (ref 150–450)
POTASSIUM SERPL-SCNC: 3.7 MMOL/L (ref 3.5–5.3)
PRODUCT BLOOD TYPE: 6200
PRODUCT BLOOD TYPE: 6200
PRODUCT CODE: NORMAL
PRODUCT CODE: NORMAL
RBC # BLD AUTO: 4.3 X10*6/UL (ref 4.5–5.9)
SODIUM SERPL-SCNC: 133 MMOL/L (ref 136–145)
UNIT ABO: NORMAL
UNIT ABO: NORMAL
UNIT NUMBER: NORMAL
UNIT NUMBER: NORMAL
UNIT RH: NORMAL
UNIT RH: NORMAL
UNIT VOLUME: 350
UNIT VOLUME: 350
WBC # BLD AUTO: 9.8 X10*3/UL (ref 4.4–11.3)
XM INTEP: NORMAL
XM INTEP: NORMAL

## 2025-08-07 PROCEDURE — 85027 COMPLETE CBC AUTOMATED: CPT

## 2025-08-07 PROCEDURE — 97530 THERAPEUTIC ACTIVITIES: CPT | Mod: GP,CQ

## 2025-08-07 PROCEDURE — 80048 BASIC METABOLIC PNL TOTAL CA: CPT

## 2025-08-07 PROCEDURE — 2500000004 HC RX 250 GENERAL PHARMACY W/ HCPCS (ALT 636 FOR OP/ED): Performed by: STUDENT IN AN ORGANIZED HEALTH CARE EDUCATION/TRAINING PROGRAM

## 2025-08-07 PROCEDURE — 2500000001 HC RX 250 WO HCPCS SELF ADMINISTERED DRUGS (ALT 637 FOR MEDICARE OP): Performed by: STUDENT IN AN ORGANIZED HEALTH CARE EDUCATION/TRAINING PROGRAM

## 2025-08-07 PROCEDURE — 2500000001 HC RX 250 WO HCPCS SELF ADMINISTERED DRUGS (ALT 637 FOR MEDICARE OP)

## 2025-08-07 PROCEDURE — 2500000004 HC RX 250 GENERAL PHARMACY W/ HCPCS (ALT 636 FOR OP/ED)

## 2025-08-07 PROCEDURE — 36415 COLL VENOUS BLD VENIPUNCTURE: CPT

## 2025-08-07 PROCEDURE — 2500000002 HC RX 250 W HCPCS SELF ADMINISTERED DRUGS (ALT 637 FOR MEDICARE OP, ALT 636 FOR OP/ED): Performed by: STUDENT IN AN ORGANIZED HEALTH CARE EDUCATION/TRAINING PROGRAM

## 2025-08-07 PROCEDURE — 97116 GAIT TRAINING THERAPY: CPT | Mod: GP,CQ

## 2025-08-07 PROCEDURE — 1100000001 HC PRIVATE ROOM DAILY

## 2025-08-07 PROCEDURE — 82947 ASSAY GLUCOSE BLOOD QUANT: CPT

## 2025-08-07 RX ORDER — SYRING-NEEDL,DISP,INSUL,0.3 ML 29 G X1/2"
296 SYRINGE, EMPTY DISPOSABLE MISCELLANEOUS ONCE
Status: COMPLETED | OUTPATIENT
Start: 2025-08-07 | End: 2025-08-07

## 2025-08-07 RX ORDER — POLYETHYLENE GLYCOL 3350 17 G/17G
17 POWDER, FOR SOLUTION ORAL 3 TIMES DAILY
Status: DISCONTINUED | OUTPATIENT
Start: 2025-08-07 | End: 2025-08-08 | Stop reason: HOSPADM

## 2025-08-07 RX ADMIN — HEPARIN SODIUM 5000 UNITS: 5000 INJECTION, SOLUTION INTRAVENOUS; SUBCUTANEOUS at 00:11

## 2025-08-07 RX ADMIN — ACETAMINOPHEN 650 MG: 325 TABLET ORAL at 11:56

## 2025-08-07 RX ADMIN — OXYCODONE HYDROCHLORIDE 10 MG: 5 TABLET ORAL at 08:23

## 2025-08-07 RX ADMIN — HEPARIN SODIUM 5000 UNITS: 5000 INJECTION, SOLUTION INTRAVENOUS; SUBCUTANEOUS at 16:00

## 2025-08-07 RX ADMIN — ATENOLOL 25 MG: 25 TABLET ORAL at 08:24

## 2025-08-07 RX ADMIN — RAMELTEON 8 MG: 8 TABLET ORAL at 20:29

## 2025-08-07 RX ADMIN — ACETAMINOPHEN 650 MG: 325 TABLET ORAL at 00:11

## 2025-08-07 RX ADMIN — CALCIUM 1 TABLET: 500 TABLET ORAL at 16:00

## 2025-08-07 RX ADMIN — CALCIUM 1 TABLET: 500 TABLET ORAL at 08:24

## 2025-08-07 RX ADMIN — OXYCODONE HYDROCHLORIDE 10 MG: 5 TABLET ORAL at 19:21

## 2025-08-07 RX ADMIN — INSULIN LISPRO 1 UNITS: 100 INJECTION, SOLUTION INTRAVENOUS; SUBCUTANEOUS at 16:00

## 2025-08-07 RX ADMIN — SENNOSIDES, DOCUSATE SODIUM 2 TABLET: 50; 8.6 TABLET, FILM COATED ORAL at 08:23

## 2025-08-07 RX ADMIN — CALCIUM 1 TABLET: 500 TABLET ORAL at 11:56

## 2025-08-07 RX ADMIN — OXYCODONE HYDROCHLORIDE 10 MG: 5 TABLET ORAL at 23:29

## 2025-08-07 RX ADMIN — OXYCODONE HYDROCHLORIDE 10 MG: 5 TABLET ORAL at 14:11

## 2025-08-07 RX ADMIN — HEPARIN SODIUM 5000 UNITS: 5000 INJECTION, SOLUTION INTRAVENOUS; SUBCUTANEOUS at 08:24

## 2025-08-07 RX ADMIN — ACETAMINOPHEN 650 MG: 325 TABLET ORAL at 23:29

## 2025-08-07 RX ADMIN — HEPARIN SODIUM 5000 UNITS: 5000 INJECTION, SOLUTION INTRAVENOUS; SUBCUTANEOUS at 23:29

## 2025-08-07 RX ADMIN — OXYCODONE HYDROCHLORIDE 10 MG: 5 TABLET ORAL at 03:34

## 2025-08-07 RX ADMIN — INSULIN LISPRO 1 UNITS: 100 INJECTION, SOLUTION INTRAVENOUS; SUBCUTANEOUS at 11:56

## 2025-08-07 RX ADMIN — SODIUM CHLORIDE 1000 ML: 0.9 INJECTION, SOLUTION INTRAVENOUS at 11:02

## 2025-08-07 RX ADMIN — MAGNESIUM CITRATE 296 ML: 1.75 LIQUID ORAL at 06:38

## 2025-08-07 RX ADMIN — ENALAPRIL MALEATE 20 MG: 20 TABLET ORAL at 08:24

## 2025-08-07 ASSESSMENT — COGNITIVE AND FUNCTIONAL STATUS - GENERAL
CLIMB 3 TO 5 STEPS WITH RAILING: A LITTLE
MOVING TO AND FROM BED TO CHAIR: A LITTLE
MOBILITY SCORE: 16
PERSONAL GROOMING: A LITTLE
CLIMB 3 TO 5 STEPS WITH RAILING: A LOT
DAILY ACTIVITIY SCORE: 19
MOVING FROM LYING ON BACK TO SITTING ON SIDE OF FLAT BED WITH BEDRAILS: A LITTLE
CLIMB 3 TO 5 STEPS WITH RAILING: A LOT
WALKING IN HOSPITAL ROOM: A LITTLE
HELP NEEDED FOR BATHING: A LITTLE
DAILY ACTIVITIY SCORE: 22
DRESSING REGULAR UPPER BODY CLOTHING: A LITTLE
TOILETING: A LITTLE
STANDING UP FROM CHAIR USING ARMS: A LITTLE
MOBILITY SCORE: 18
DRESSING REGULAR LOWER BODY CLOTHING: A LITTLE
MOVING TO AND FROM BED TO CHAIR: A LITTLE
STANDING UP FROM CHAIR USING ARMS: A LITTLE
TURNING FROM BACK TO SIDE WHILE IN FLAT BAD: A LOT
MOVING TO AND FROM BED TO CHAIR: A LITTLE
MOBILITY SCORE: 20
STANDING UP FROM CHAIR USING ARMS: A LITTLE
TURNING FROM BACK TO SIDE WHILE IN FLAT BAD: A LITTLE
TOILETING: A LITTLE
DRESSING REGULAR LOWER BODY CLOTHING: A LITTLE

## 2025-08-07 ASSESSMENT — PAIN - FUNCTIONAL ASSESSMENT
PAIN_FUNCTIONAL_ASSESSMENT: 0-10

## 2025-08-07 ASSESSMENT — PAIN DESCRIPTION - DESCRIPTORS
DESCRIPTORS: ACHING
DESCRIPTORS: ACHING

## 2025-08-07 ASSESSMENT — PAIN SCALES - GENERAL
PAINLEVEL_OUTOF10: 7
PAINLEVEL_OUTOF10: 3
PAINLEVEL_OUTOF10: 7
PAINLEVEL_OUTOF10: 7
PAINLEVEL_OUTOF10: 0 - NO PAIN
PAINLEVEL_OUTOF10: 4

## 2025-08-07 NOTE — PROGRESS NOTES
[facility] is offering a P2P to be completed by 8/19. PH: [phone] opt 1 then opt 2. I did let send Fernanda Burdick CNP with neurosurgery and did relay this information to the patient. P2P will be completed and I will await results. I will continue to follow with a safe discharge plan.

## 2025-08-07 NOTE — CARE PLAN
Problem: Pain - Adult  Goal: Verbalizes/displays adequate comfort level or baseline comfort level  Outcome: Progressing     Problem: Safety - Adult  Goal: Free from fall injury  Outcome: Progressing     Problem: Discharge Planning  Goal: Discharge to home or other facility with appropriate resources  Outcome: Progressing     Problem: Chronic Conditions and Co-morbidities  Goal: Patient's chronic conditions and co-morbidity symptoms are monitored and maintained or improved  Outcome: Progressing     Problem: Nutrition  Goal: Nutrient intake appropriate for maintaining nutritional needs  Outcome: Progressing   The patient's goals for the shift include      The clinical goals for the shift include Pt pain will be managed

## 2025-08-07 NOTE — CARE PLAN
The clinical goals for the shift include pt will mobilze with PT/OT      Problem: Pain - Adult  Goal: Verbalizes/displays adequate comfort level or baseline comfort level  Outcome: Progressing     Problem: Safety - Adult  Goal: Free from fall injury  Outcome: Progressing     Problem: Discharge Planning  Goal: Discharge to home or other facility with appropriate resources  Outcome: Progressing

## 2025-08-07 NOTE — PROGRESS NOTES
"Jared Cox is a 64 y.o. male on day 3 of admission presenting with Spondylosis with myelopathy, thoracic region.    Subjective   No acute events overnight. Voiding and passing gas. No BM yet. No abdominal pain. Pain controlled.    Objective     Physical Exam  Constitutional: No acute distress  Resp: breathing comfortably on room air  Neuro: Awake, Ox3  face symmetric  RUE 5/5  LUE 5/5  RLE 5/5  LLE 5/5  sensation intact to light touch  Psych: appropriate  Incision c/d/I, staples in place   Drain in place     Last Recorded Vitals  Blood pressure 132/78, pulse 99, temperature 37.6 °C (99.7 °F), resp. rate 18, height 1.727 m (5' 8\"), weight 114 kg (251 lb 15.8 oz), SpO2 95%.  Intake/Output last 3 Shifts:  I/O last 3 completed shifts:  In: 300 (2.6 mL/kg) [P.O.:300]  Out: 1507.5 (13.2 mL/kg) [Urine:1475 (0.4 mL/kg/hr); Drains:32.5]  Weight: 114.3 kg     Relevant Results  Lab Results   Component Value Date    WBC 12.7 (H) 08/06/2025    HGB 10.3 (L) 08/06/2025    HCT 32.4 (L) 08/06/2025    MCV 92 08/06/2025     (L) 08/06/2025         Assessment & Plan  Spondylosis with myelopathy, thoracic region    HTN (hypertension)    Hiatal hernia    Diabetes mellitus, type 2 (Multi)    Spinal stenosis of lumbar region    Neurogenic claudication    Spondylolisthesis at L5-S1 level      Jared Cox is a 64 year old with h/o HTN, HLD, DM2, CKD3, GERD, psoriasis, p/w thoracic myelopathy, 8/4 s/p T11-L1 transpedicular decompression, T11-L2 fusion, 8/5 Uprights hardware in posn    Plan  Floor  Maintain drain, monitor output  AM labs  Needs BM  Continue to ambulate and get out of bed TID   PTOT-rehab          Sin García MD    "

## 2025-08-07 NOTE — PROGRESS NOTES
Physical Therapy    Physical Therapy Treatment    Patient Name: Jared Cox  MRN: [id number]  Today's Date: 8/7/2025  Time Calculation  Start Time: 1107  Stop Time: 1135  Time Calculation (min): 28 min     6027/6027-A    Assessment/Plan   PT Assessment  PT Assessment Results: Decreased strength, Decreased endurance, Impaired balance, Decreased mobility, Orthopedic restrictions  Rehab Prognosis: Excellent  Barriers to Discharge Home: Caregiver assistance, Physical needs  Caregiver Assistance: Caregiver assistance needed per identified barriers - however, level of patient's required assistance exceeds assistance available at home  Physical Needs: Intermittent mobility assistance needed, High falls risk due to function or environment  Evaluation/Treatment Tolerance: Patient tolerated treatment well  Strengths: Premorbid level of function, Support of extended family/friends, Support and attitude of living partners  Barriers to Participation: Rehab experience  End of Session Communication: Bedside nurse    Assessment Comment: Patient presents with good effort throughout todays session. Demonstrates improving endurance and gait distances. Difficulty with upright posture throughout session. Decreased cadance with increased time for ambulation this date. Very pleasant, motivated and willing to participate in all tasks. Call light and all needs in reach.  End of Session Patient Position: Up in chair, Alarm off, caregiver present  PT Plan  Treatment/Interventions: Bed mobility, Transfer training, Gait training, Stair training, Balance training, Neuromuscular re-education, Strengthening, Endurance training, Therapeutic exercise, Therapeutic activity, Home exercise program  PT Plan: Ongoing PT  PT Frequency: Daily  PT Discharge Recommendations: High intensity level of continued care (may progress to low pending progress with PT)  Equipment Recommended upon Discharge: Wheeled walker  PT Recommended Transfer Status: Assist x1,  Assistive device (RW)  PT - OK to Discharge: Yes (eval complete and D/C recommendations made)    General Visit Information:   PT  Visit  PT Received On: 08/07/25  General  Prior to Session Communication: Bedside nurse  Patient Position Received: Bed, 3 rail up, Alarm off, caregiver present  General Comment: Pt agreeable to therapy this date. Spouse present during session.  Subjective     Precautions:  Precautions  Medical Precautions: Fall precautions, Spinal precautions  Post-Surgical Precautions: Spinal precautions  Precautions Comment: Pt able to recall 2/3 precautions, continued education provided to ensure understanding. IV, hemovac    Vital Signs:  Vital Signs  SpO2:  (with ambulation automatic reading stating 74 SpO2 however, adjusted on wrist and resolved to 97 beleived to be an inaccurate reading as pt was asymptomatic of SOB throughout)  Objective     Pain:  Pain Assessment  Pain Assessment: 0-10  0-10 (Numeric) Pain Score: 0 - No pain    Cognition:  Cognition  Orientation Level: Oriented X4         Treatments:  Therapeutic Exercise  Therapeutic Exercise Performed: Yes  Therapeutic Exercise Activity 1: seated, BLE ankle pumps, LAQ, marches, hip ABD/ADD 15x with VC/demo for proper performance. Education provided on benefits of HEP/frequency with good carry over demonstrated.Focus on LE strength to improve endurance and stability with functional mobility.           Ambulation/Gait Training  Ambulation/Gait Training Performed: Yes  Ambulation/Gait Training 1  Surface 1: Level tile  Device 1: Rolling walker (FWW)  Assistance 1: Minimum assistance, Minimal verbal cues  Quality of Gait 1: Narrow base of support, Diminished heel strike, Decreased step length, Forward flexed posture  Comments/Distance (ft) 1: Pt ambulated 200' with FWW, Lucy for IV pole management, SUP for trunk support. Pt demonstrates step through gait, ER of BLEs present. Diminished heel strike, NBOS with short stride length. Fair foot  clearance. Forward flexed posture, VC to correct. Good AD management with directional changes.  Transfers  Transfer: Yes  Transfer 1  Transfer From 1: Stand to  Transfer to 1: Chair with arms  Technique 1: Stand pivot  Transfer Device 1: Walker (FWW)  Transfer Level of Assistance 1: Close supervision  Trials/Comments 1: Pt performed stand pivot from FWW<>chair SUP. Pt requires minimal VC for sequencing, good eccentric control to sitting position. Good AD management throughout.  Transfers 2  Transfer From 2: Chair with arms to  Transfer to 2: Stand  Technique 2: Stand to sit, Sit to stand  Transfer Device 2: Walker (FWW)  Transfer Level of Assistance 2: Close supervision  Trials/Comments 2: Pt performed STS from chair<>FWW SUP. No VC required for sequencing.          Outcome Measures:     [facility] Basic Mobility  Turning from your back to your side while in a flat bed without using bedrails: A little  Moving from lying on your back to sitting on the side of a flat bed without using bedrails: A lot  Moving to and from bed to chair (including a wheelchair): A little  Standing up from a chair using your arms (e.g. wheelchair or bedside chair): A little  To walk in hospital room: A little  Climbing 3-5 steps with railing: A lot  Basic Mobility - Total Score: 16                                      Education Documentation  Precautions, taught by Jody Abdalla PTA at 8/7/2025  2:13 PM.  Learner: Patient  Readiness: Acceptance  Method: Explanation, Demonstration  Response: Verbalizes Understanding, Needs Reinforcement    Body Mechanics, taught by Jody Abdalla PTA at 8/7/2025  2:13 PM.  Learner: Patient  Readiness: Acceptance  Method: Explanation, Demonstration  Response: Verbalizes Understanding, Needs Reinforcement    Home Exercise Program, taught by Jody Abdalla PTA at 8/7/2025  2:13 PM.  Learner: Patient  Readiness: Acceptance  Method: Explanation, Demonstration  Response: Verbalizes Understanding, Needs  Reinforcement    Mobility Training, taught by Jody Abdalla PTA at 8/7/2025  2:13 PM.  Learner: Patient  Readiness: Acceptance  Method: Explanation, Demonstration  Response: Verbalizes Understanding, Needs Reinforcement    Education Comments  No comments found.           EDUCATION:  Outpatient Education  Education Comment: Education provide on benefits of HEP/frequency and well as PLB with mobility.  Encounter Problems       Encounter Problems (Active)       Balance       Pt will score >/=24 on the Tinetti to indicate low fall risk (Not Progressing)       Start:  08/05/25    Expected End:  08/19/25               Mobility       Pt will complete bed mobility independently via log roll technique with HOB elevated to mimic home set up and no use of bed rails  (Not Progressing)       Start:  08/05/25    Expected End:  08/19/25            Pt will amb >200ft with RW and modified independence in prep for safe discharge home  (Progressing)       Start:  08/05/25    Expected End:  08/19/25               PT Transfers       Pt will transfer sit to stand with RW and modified independence in prep for out of bed mobility  (Progressing)       Start:  08/05/25    Expected End:  08/19/25

## 2025-08-08 VITALS
HEIGHT: 68 IN | WEIGHT: 251.99 LBS | TEMPERATURE: 98.8 F | DIASTOLIC BLOOD PRESSURE: 78 MMHG | BODY MASS INDEX: 38.19 KG/M2 | RESPIRATION RATE: 16 BRPM | HEART RATE: 73 BPM | OXYGEN SATURATION: 97 % | SYSTOLIC BLOOD PRESSURE: 131 MMHG

## 2025-08-08 LAB
ANION GAP SERPL CALC-SCNC: 11 MMOL/L (ref 10–20)
BUN SERPL-MCNC: 13 MG/DL (ref 6–23)
CALCIUM SERPL-MCNC: 8.1 MG/DL (ref 8.6–10.6)
CHLORIDE SERPL-SCNC: 99 MMOL/L (ref 98–107)
CO2 SERPL-SCNC: 28 MMOL/L (ref 21–32)
CREAT SERPL-MCNC: 0.96 MG/DL (ref 0.5–1.3)
EGFRCR SERPLBLD CKD-EPI 2021: 88 ML/MIN/1.73M*2
ERYTHROCYTE [DISTWIDTH] IN BLOOD BY AUTOMATED COUNT: 14 % (ref 11.5–14.5)
GLUCOSE BLD MANUAL STRIP-MCNC: 145 MG/DL (ref 74–99)
GLUCOSE BLD MANUAL STRIP-MCNC: 150 MG/DL (ref 74–99)
GLUCOSE SERPL-MCNC: 140 MG/DL (ref 74–99)
HCT VFR BLD AUTO: 29.8 % (ref 41–52)
HGB BLD-MCNC: 9.5 G/DL (ref 13.5–17.5)
MCH RBC QN AUTO: 28.9 PG (ref 26–34)
MCHC RBC AUTO-ENTMCNC: 31.9 G/DL (ref 32–36)
MCV RBC AUTO: 91 FL (ref 80–100)
NRBC BLD-RTO: 0 /100 WBCS (ref 0–0)
PLATELET # BLD AUTO: 170 X10*3/UL (ref 150–450)
POTASSIUM SERPL-SCNC: 3.9 MMOL/L (ref 3.5–5.3)
RBC # BLD AUTO: 3.29 X10*6/UL (ref 4.5–5.9)
SODIUM SERPL-SCNC: 134 MMOL/L (ref 136–145)
WBC # BLD AUTO: 6.3 X10*3/UL (ref 4.4–11.3)

## 2025-08-08 PROCEDURE — 80048 BASIC METABOLIC PNL TOTAL CA: CPT

## 2025-08-08 PROCEDURE — 97116 GAIT TRAINING THERAPY: CPT | Mod: GP,CQ

## 2025-08-08 PROCEDURE — 85027 COMPLETE CBC AUTOMATED: CPT

## 2025-08-08 PROCEDURE — 97530 THERAPEUTIC ACTIVITIES: CPT | Mod: GP,CQ

## 2025-08-08 PROCEDURE — 2500000001 HC RX 250 WO HCPCS SELF ADMINISTERED DRUGS (ALT 637 FOR MEDICARE OP)

## 2025-08-08 PROCEDURE — 36415 COLL VENOUS BLD VENIPUNCTURE: CPT

## 2025-08-08 PROCEDURE — 2500000004 HC RX 250 GENERAL PHARMACY W/ HCPCS (ALT 636 FOR OP/ED): Performed by: STUDENT IN AN ORGANIZED HEALTH CARE EDUCATION/TRAINING PROGRAM

## 2025-08-08 PROCEDURE — 2500000001 HC RX 250 WO HCPCS SELF ADMINISTERED DRUGS (ALT 637 FOR MEDICARE OP): Performed by: STUDENT IN AN ORGANIZED HEALTH CARE EDUCATION/TRAINING PROGRAM

## 2025-08-08 PROCEDURE — 82947 ASSAY GLUCOSE BLOOD QUANT: CPT

## 2025-08-08 PROCEDURE — 99239 HOSP IP/OBS DSCHRG MGMT >30: CPT | Performed by: NURSE PRACTITIONER

## 2025-08-08 RX ORDER — ACETAMINOPHEN 325 MG/1
650 TABLET ORAL EVERY 6 HOURS PRN
Start: 2025-08-08

## 2025-08-08 RX ORDER — CYCLOBENZAPRINE HCL 10 MG
10 TABLET ORAL 3 TIMES DAILY PRN
Qty: 21 TABLET | Refills: 0 | Status: SHIPPED | OUTPATIENT
Start: 2025-08-08 | End: 2025-08-15

## 2025-08-08 RX ORDER — AMOXICILLIN 250 MG
2 CAPSULE ORAL 2 TIMES DAILY
Qty: 28 TABLET | Refills: 0 | Status: SHIPPED | OUTPATIENT
Start: 2025-08-08 | End: 2025-08-15

## 2025-08-08 RX ORDER — OXYCODONE HYDROCHLORIDE 5 MG/1
5 TABLET ORAL EVERY 6 HOURS PRN
Qty: 28 TABLET | Refills: 0 | Status: SHIPPED | OUTPATIENT
Start: 2025-08-08 | End: 2025-08-15

## 2025-08-08 RX ADMIN — CALCIUM 1 TABLET: 500 TABLET ORAL at 09:27

## 2025-08-08 RX ADMIN — CALCIUM 1 TABLET: 500 TABLET ORAL at 11:38

## 2025-08-08 RX ADMIN — SENNOSIDES, DOCUSATE SODIUM 2 TABLET: 50; 8.6 TABLET, FILM COATED ORAL at 08:19

## 2025-08-08 RX ADMIN — ACETAMINOPHEN 650 MG: 325 TABLET ORAL at 11:34

## 2025-08-08 RX ADMIN — OXYCODONE HYDROCHLORIDE 10 MG: 5 TABLET ORAL at 03:54

## 2025-08-08 RX ADMIN — HEPARIN SODIUM 5000 UNITS: 5000 INJECTION, SOLUTION INTRAVENOUS; SUBCUTANEOUS at 09:27

## 2025-08-08 RX ADMIN — ATENOLOL 25 MG: 25 TABLET ORAL at 09:27

## 2025-08-08 RX ADMIN — ENALAPRIL MALEATE 20 MG: 20 TABLET ORAL at 10:43

## 2025-08-08 RX ADMIN — ACETAMINOPHEN 650 MG: 325 TABLET ORAL at 06:08

## 2025-08-08 ASSESSMENT — COGNITIVE AND FUNCTIONAL STATUS - GENERAL
STANDING UP FROM CHAIR USING ARMS: A LITTLE
CLIMB 3 TO 5 STEPS WITH RAILING: A LITTLE
TURNING FROM BACK TO SIDE WHILE IN FLAT BAD: A LITTLE
MOVING TO AND FROM BED TO CHAIR: A LITTLE
MOBILITY SCORE: 18
MOBILITY SCORE: 18
STANDING UP FROM CHAIR USING ARMS: A LITTLE
MOVING TO AND FROM BED TO CHAIR: A LITTLE
TURNING FROM BACK TO SIDE WHILE IN FLAT BAD: A LITTLE
CLIMB 3 TO 5 STEPS WITH RAILING: A LOT
MOVING FROM LYING ON BACK TO SITTING ON SIDE OF FLAT BED WITH BEDRAILS: A LITTLE
WALKING IN HOSPITAL ROOM: A LITTLE
WALKING IN HOSPITAL ROOM: A LITTLE

## 2025-08-08 ASSESSMENT — PAIN SCALES - GENERAL
PAINLEVEL_OUTOF10: 3
PAINLEVEL_OUTOF10: 2
PAINLEVEL_OUTOF10: 3

## 2025-08-08 ASSESSMENT — PAIN - FUNCTIONAL ASSESSMENT
PAIN_FUNCTIONAL_ASSESSMENT: 0-10

## 2025-08-08 NOTE — PROGRESS NOTES
Physical Therapy Treatment    Patient Name: Jared Cox  MRN: [id number]  Today's Date: 8/8/2025  Room: [facility]  Time Calculation  Start Time: 1009  Stop Time: 1048  Time Calculation (min): 39 min       Assessment/Plan   PT Assessment  PT Assessment Results: Decreased strength, Decreased endurance, Impaired balance, Decreased mobility, Orthopedic restrictions  Rehab Prognosis: Excellent  Barriers to Discharge Home: No anticipated barriers  Physical Needs: Intermittent mobility assistance needed, Intermittent ADL assistance needed  Evaluation/Treatment Tolerance: Patient tolerated treatment well  Strengths: Ability to acquire knowledge, Attitude of self  Barriers to Participation: Comorbidities  End of Session Communication: Care Coordinator  End of Session Patient Position: Up in chair  PT Plan  Inpatient/Swing Bed or Outpatient: Inpatient  PT Plan  Treatment/Interventions: Bed mobility, Transfer training, Gait training, Stair training, Balance training, Neuromuscular re-education, Strengthening, Endurance training, Therapeutic exercise, Therapeutic activity, Home exercise program  PT Plan: Ongoing PT  PT Frequency: Daily  PT Discharge Recommendations: Low intensity level of continued care  Equipment Recommended upon Discharge: Wheeled walker  PT Recommended Transfer Status: Assist x1  PT - OK to Discharge: Yes (eval complete and D/C recommendations made)  Assessment: Patient is progressing Well with therapy this date. Demos good progression with completion of 20 steps total @ SBA. Pt prefers to go home instead of rehab. PT/TCC updated. Would continue to benefit from continued skilled PT to address all mobility deficits; Patient remains appropriate for LOW intensity therapy when medically ready for discharge from acute stay.  Will continue to follow.     General Visit Information:   PT  Visit  PT Received On: 08/08/25  PT Missed Visit: Yes  Missed Visit Reason: Other (Comment) (pt up in bathroom wants to get  cleaned up prior to PT session)  Prior to Session Communication: Bedside nurse  Patient Position Received: Up in chair  Family/Caregiver Present: Yes  Caregiver Feedback: spouse present   Subjective   Subjective: Pt pleasant and agreeable to therapy upon approach    Precautions:  Precautions  Medical Precautions: Fall precautions, Spinal precautions  Post-Surgical Precautions: Spinal precautions  Vital Signs:    Objective   Pain:  Pain Assessment  Pain Assessment: 0-10  0-10 (Numeric) Pain Score: 2  Pain Type: Acute pain  Pain Location: Back  Pain Interventions: Rest  Cognition:  Cognition  Overall Cognitive Status: Within Functional Limits  Orientation Level: Oriented X4     Lines/Tubes/Drains:  Closed/Suction Drain Left;Superior Back Accordion 15 Fr. (Active)   Number of days: 3     PT Treatments:        Ambulation/Gait Training 1  Surface 1: Level tile  Device 1: Rolling walker  Assistance 1: Close supervision  Quality of Gait 1: Narrow base of support, Decreased step length, Forward flexed posture  Comments/Distance (ft) 1: 200'x2 vc for upright posture and staying within walker  Transfer 1  Transfer From 1: Sit to  Transfer to 1: Stand  Technique 1: Sit to stand, Stand to sit  Transfer Device 1: Walker  Transfer Level of Assistance 1: Close supervision  Trials/Comments 1: vc for handplacement  Transfers 2  Transfer From 2: Stand to  Transfer to 2: Chair with arms  Technique 2: Stand pivot  Transfer Device 2: Walker  Transfer Level of Assistance 2: Close supervision  Trials/Comments 2: vc for safety backing up  Stairs  Stairs: Yes  Stairs  Rails 1: Bilateral, Left  Device 1: Railing  Assistance 1: Close supervision  Comment/Number of Steps 1: 20 steps total, standing rest breaks after set of 4. Able to complete with single and double rail. Vc for wider NAI with LE        Activity tolerance:  Activity Tolerance  Endurance: Endurance does not limit participation in activity  Outcome Measures:  [facility] Basic  Mobility  Turning from your back to your side while in a flat bed without using bedrails: A little  Moving from lying on your back to sitting on the side of a flat bed without using bedrails: A little  Moving to and from bed to chair (including a wheelchair): A little  Standing up from a chair using your arms (e.g. wheelchair or bedside chair): A little  To walk in hospital room: A little  Climbing 3-5 steps with railing: A little  Basic Mobility - Total Score: 18     Education Documentation  Precautions, taught by Mayi Ascencio PTA at 8/8/2025 12:18 PM.  Learner: Patient  Readiness: Acceptance  Method: Explanation  Response: Verbalizes Understanding  Comment: explaination of spinal precautions    Body Mechanics, taught by Mayi Ascencio PTA at 8/8/2025 12:18 PM.  Learner: Patient  Readiness: Acceptance  Method: Explanation  Response: Verbalizes Understanding  Comment: explaination of spinal precautions    Home Exercise Program, taught by Mayi Ascencio PTA at 8/8/2025 12:18 PM.  Learner: Patient  Readiness: Acceptance  Method: Explanation  Response: Verbalizes Understanding  Comment: explaination of spinal precautions    Mobility Training, taught by Mayi Ascencio PTA at 8/8/2025 12:18 PM.  Learner: Patient  Readiness: Acceptance  Method: Explanation  Response: Verbalizes Understanding  Comment: explaination of spinal precautions    Handouts, taught by Mayi Ascencio PTA at 8/8/2025 12:18 PM.  Learner: Patient  Readiness: Acceptance  Method: Explanation  Response: Verbalizes Understanding  Comment: explaination of spinal precautions    Body Mechanics, taught by Mayi Ascencio PTA at 8/8/2025 12:18 PM.  Learner: Patient  Readiness: Acceptance  Method: Explanation  Response: Verbalizes Understanding  Comment: explaination of spinal precautions    Precautions, taught by Mayi Ascencio PTA at 8/8/2025 12:18 PM.  Learner: Patient  Readiness: Acceptance  Method: Explanation  Response: Verbalizes Understanding  Comment: explaination  of spinal precautions    ADL Training, taught by Mayi Ascencio PTA at 8/8/2025 12:18 PM.  Learner: Patient  Readiness: Acceptance  Method: Explanation  Response: Verbalizes Understanding  Comment: explaination of spinal precautions    Education Comments  No comments found.        OP EDUCATION:       Encounter Problems       Encounter Problems (Active)       Balance       Pt will score >/=24 on the Tinetti to indicate low fall risk (Progressing)       Start:  08/05/25    Expected End:  08/19/25               Mobility       Pt will complete bed mobility independently via log roll technique with HOB elevated to mimic home set up and no use of bed rails  (Progressing)       Start:  08/05/25    Expected End:  08/19/25            Pt will amb >200ft with RW and modified independence in prep for safe discharge home  (Progressing)       Start:  08/05/25    Expected End:  08/19/25               PT Transfers       Pt will transfer sit to stand with RW and modified independence in prep for out of bed mobility  (Progressing)       Start:  08/05/25    Expected End:  08/19/25

## 2025-08-08 NOTE — CARE PLAN
The patient's goals for the shift include      The clinical goals for the shift include Pt will be free from falls/injury during this shift    Over the shift, the patient did make progress toward the following goals.

## 2025-08-08 NOTE — CARE PLAN
Patient dc instructions given. Patient verbalized understanding. Off the floor with transport at 1414

## 2025-08-08 NOTE — DISCHARGE SUMMARY
Discharge Diagnosis  Spondylosis with myelopathy, thoracic region    Issues Requiring Follow-Up      Test Results Pending At Discharge  Pending Labs       No current pending labs.            Hospital Course  Jared Cox is a 64 y.o. male with a past medical history of  PMHX includes anxiety, chronic pain, CKD, GERD, hiatal hernia, HLD, HTN, insomnia, obesity and DM2.   On 8/4 he was taken to the OR for T11-L1 transpedicular decompression; T10-L2 posterior fusion secondary to Spondylosis with myelopathy of thoracic region. He was admitted to Neurosurgery service post operatively.     8/5 urpight XR completed with hardware in position.       PT/OT evaluated patient and recommended Rehab     On the day of discharge, the patient was seen and evaluated by the neurosurgery team and deemed suitable for discharge. The patient was given detailed discharge instructions and were scheduled to follow up as an outpatient.      Visit Vitals  /78   Pulse 73   Temp 37.1 °C (98.8 °F)   Resp 16     Vitals:    08/04/25 0639   Weight: 114 kg (251 lb 15.8 oz)         There is no immunization history on file for this patient.    Results      Pertinent Physical Exam At Time of Discharge  Physical Exam  General: in bed, awake, no distress  HEENT: normocephalic; PERRL; EOMI; tongue midline  Neuro: Alert and oriented x3; follows commands; ANDRE's; BUE 5/5, BLE 5/5  Cardiac: regular rate and rhythm  Resp: respirations easy and regular  Abd: BS +x4, soft, non-tender, non-distended  Extr: no edema; pulses palpable x4  Skin: warm, dry, intact. Back incision clean, dry, intact with staples   Psych: appropriate and cooperative   Home Medications     Medication List      START taking these medications     acetaminophen 325 mg tablet; Commonly known as: Tylenol; Take 2 tablets   (650 mg) by mouth every 6 hours if needed for mild pain (1 - 3).   cyclobenzaprine 10 mg tablet; Commonly known as: Flexeril; Take 1 tablet   (10 mg) by mouth 3 times a  day as needed for muscle spasms for up to 7   days.   oxyCODONE 5 mg immediate release tablet; Commonly known as: Roxicodone;   Take 1 tablet (5 mg) by mouth every 6 hours if needed for moderate pain (4   - 6) or severe pain (7 - 10) for up to 7 days.   sennosides-docusate sodium 8.6-50 mg tablet; Commonly known as:   Mar-Colace; Take 2 tablets by mouth 2 times a day for 7 days. Take while   taking pain medication oxycodone to prevent constipation     CONTINUE taking these medications     ascorbic acid 250 mg tablet; Commonly known as: Vitamin C   atenolol 25 mg tablet; Commonly known as: Tenormin   cyanocobalamin 100 mcg tablet; Commonly known as: Vitamin B-12   enalapril 20 mg tablet; Commonly known as: Vasotec   Jentadueto XR 5-1,000 mg tablet, IR - ER, biphasic 24hr; Generic drug:   linagliptin-metformin   multivitamin tablet   NON FORMULARY   NON FORMULARY   NON FORMULARY   NON FORMULARY   ramelteon 8 mg tablet; Commonly known as: Rozerem   tadalafil 20 mg tablet   testosterone cypionate 200 mg/mL injection; Commonly known as:   Depo-Testosterone   triamcinolone 0.1 % cream; Commonly known as: Kenalog   Vitamin D3 25 mcg (1,000 units) tablet; Generic drug: cholecalciferol   zinc acetate 50 mg (zinc) capsule     STOP taking these medications     chlorhexidine 0.12 % solution; Commonly known as: Peridex   chlorhexidine 4 % external liquid; Commonly known as: Hibiclens   KRILL OIL ORAL   Char's wort 300 mg capsule     I spent a total of 35 minutes with the patient and family and greater than 50% was spent in counseling and coordination of care.     Outpatient Follow-Up  Future Appointments   Date Time Provider Department Center   8/25/2025 11:30 AM CAROLINA Pinon-CNP [id number] [place]   10/14/2025 11:00 AM Mic Harris MD PhD [id number] [place]       CAROLINA Lyons-CNP

## 2025-08-08 NOTE — PROGRESS NOTES
"Jared Cox is a 64 y.o. male on day 4 of admission presenting with Spondylosis with myelopathy, thoracic region.    Subjective   No acute events overnight    Objective     Physical Exam  Constitutional: No acute distress  Resp: breathing comfortably on room air  Neuro: Awake, Ox3  face symmetric  RUE 5/5  LUE 5/5  RLE 5/5  LLE 5/5  sensation intact to light touch  Psych: appropriate  Incision c/d/I, staples in place   Drain in place     Last Recorded Vitals  Blood pressure (!) 150/94, pulse 76, temperature 36.5 °C (97.7 °F), temperature source Temporal, resp. rate 16, height 1.727 m (5' 8\"), weight 114 kg (251 lb 15.8 oz), SpO2 96%.  Intake/Output last 3 Shifts:  I/O last 3 completed shifts:  In: 640 (5.6 mL/kg) [P.O.:340; I.V.:300 (2.6 mL/kg)]  Out: 3195.5 (28 mL/kg) [Urine:3175 (0.8 mL/kg/hr); Drains:20.5]  Weight: 114.3 kg     Relevant Results  Lab Results   Component Value Date    WBC 9.8 08/07/2025    HGB 12.6 (L) 08/07/2025    HCT 39.7 (L) 08/07/2025    MCV 92 08/07/2025     08/07/2025         Assessment & Plan  Spondylosis with myelopathy, thoracic region    HTN (hypertension)    Hiatal hernia    Diabetes mellitus, type 2 (Multi)    Spinal stenosis of lumbar region    Neurogenic claudication    Spondylolisthesis at L5-S1 level      Jared Cox is a 64 year old with h/o HTN, HLD, DM2, CKD3, GERD, psoriasis, p/w thoracic myelopathy, 8/4 s/p T11-L1 transpedicular decompression, T11-L2 fusion, 8/5 Uprights hardware in posn    Plan  Floor  Maintain drain, monitor output  AM labs  Continue to ambulate and get out of bed TID   PTOT-rehab (P2P today)    Josh Doty MD    "

## 2025-08-08 NOTE — PROGRESS NOTES
Therapy Communication Note    Patient Name: Jared Cox  MRN: [id number]  Department: [place] [address]  [facility]  Today's Date: 8/8/2025     Discipline: Physical Therapy    Missed Visit Reason: Missed Visit Reason: Other (Comment) (pt up in bathroom wants to get cleaned up prior to PT session)    Missed Time: Attempt    Comment:

## 2025-08-22 PROBLEM — E66.01 MORBID (SEVERE) OBESITY DUE TO EXCESS CALORIES (MULTI): Status: ACTIVE | Noted: 2023-12-05

## 2025-08-22 PROBLEM — G47.00 INSOMNIA: Status: ACTIVE | Noted: 2023-11-09

## 2025-08-22 PROBLEM — L03.039 CELLULITIS OF TOE: Status: ACTIVE | Noted: 2025-08-22

## 2025-08-22 PROBLEM — R79.89 LOW TESTOSTERONE: Status: ACTIVE | Noted: 2023-11-09

## 2025-08-22 PROBLEM — N52.9 ERECTILE DYSFUNCTION: Status: ACTIVE | Noted: 2023-11-09

## 2025-08-22 PROBLEM — D58.2 ELEVATED HEMOGLOBIN: Status: ACTIVE | Noted: 2023-11-09

## 2025-08-22 PROBLEM — E11.49 TYPE 2 DIABETES MELLITUS WITH OTHER DIABETIC NEUROLOGICAL COMPLICATION: Status: ACTIVE | Noted: 2023-11-09

## 2025-08-22 PROBLEM — L84 CALLUS OF FOOT: Status: ACTIVE | Noted: 2025-08-22

## 2025-08-22 PROBLEM — N18.31 STAGE 3A CHRONIC KIDNEY DISEASE (MULTI): Status: ACTIVE | Noted: 2023-11-09

## 2025-08-22 PROBLEM — M79.10 MYALGIA: Status: ACTIVE | Noted: 2025-07-08

## 2025-08-22 PROBLEM — T14.8XXA BLISTER OF SKIN: Status: ACTIVE | Noted: 2025-08-22

## 2025-08-25 ENCOUNTER — APPOINTMENT (OUTPATIENT)
Dept: NEUROSURGERY | Facility: CLINIC | Age: 65
End: 2025-08-25
Payer: COMMERCIAL

## 2025-08-25 VITALS
HEIGHT: 68 IN | DIASTOLIC BLOOD PRESSURE: 88 MMHG | BODY MASS INDEX: 37.13 KG/M2 | SYSTOLIC BLOOD PRESSURE: 122 MMHG | WEIGHT: 245 LBS | HEART RATE: 73 BPM | TEMPERATURE: 97.5 F

## 2025-08-25 DIAGNOSIS — M43.25 FUSION OF SPINE, THORACOLUMBAR REGION: Primary | ICD-10-CM

## 2025-08-25 PROCEDURE — 3079F DIAST BP 80-89 MM HG: CPT | Performed by: NURSE PRACTITIONER

## 2025-08-25 PROCEDURE — 3008F BODY MASS INDEX DOCD: CPT | Performed by: NURSE PRACTITIONER

## 2025-08-25 PROCEDURE — 1036F TOBACCO NON-USER: CPT | Performed by: NURSE PRACTITIONER

## 2025-08-25 PROCEDURE — 1111F DSCHRG MED/CURRENT MED MERGE: CPT | Performed by: NURSE PRACTITIONER

## 2025-08-25 PROCEDURE — 1159F MED LIST DOCD IN RCRD: CPT | Performed by: NURSE PRACTITIONER

## 2025-08-25 PROCEDURE — 3074F SYST BP LT 130 MM HG: CPT | Performed by: NURSE PRACTITIONER

## 2025-08-25 PROCEDURE — 4010F ACE/ARB THERAPY RXD/TAKEN: CPT | Performed by: NURSE PRACTITIONER

## 2025-08-25 PROCEDURE — 1125F AMNT PAIN NOTED PAIN PRSNT: CPT | Performed by: NURSE PRACTITIONER

## 2025-08-25 PROCEDURE — 99024 POSTOP FOLLOW-UP VISIT: CPT | Performed by: NURSE PRACTITIONER

## 2025-08-25 ASSESSMENT — PATIENT HEALTH QUESTIONNAIRE - PHQ9
2. FEELING DOWN, DEPRESSED OR HOPELESS: NOT AT ALL
SUM OF ALL RESPONSES TO PHQ9 QUESTIONS 1 AND 2: 0
1. LITTLE INTEREST OR PLEASURE IN DOING THINGS: NOT AT ALL

## 2025-08-25 ASSESSMENT — PAIN SCALES - GENERAL: PAINLEVEL_OUTOF10: 1

## 2025-09-25 ENCOUNTER — APPOINTMENT (OUTPATIENT)
Facility: CLINIC | Age: 65
End: 2025-09-25
Payer: COMMERCIAL

## 2025-10-30 ENCOUNTER — APPOINTMENT (OUTPATIENT)
Facility: CLINIC | Age: 65
End: 2025-10-30
Payer: COMMERCIAL

## (undated) DEVICE — DRAPE, C-ARM IMAGE

## (undated) DEVICE — ELECTRODE, GROUND PLATE

## (undated) DEVICE — SPHERE, STEALTHSTATION, 5-PK

## (undated) DEVICE — EVACUATOR, WOUND, CLOSED, 3 SPRING, 400 CC, Y CONNECTING TUBE

## (undated) DEVICE — Device

## (undated) DEVICE — SUTURE, SILK, 2-0, 30 IN, SH, BLACK

## (undated) DEVICE — PATIENT SPECIFIC UNID TECHNOLOGY

## (undated) DEVICE — MANIFOLD, 4 PORT NEPTUNE STANDARD

## (undated) DEVICE — DRESSING, MEPILEX, BORDER FLEX, 3 X 3

## (undated) DEVICE — HEMOSTAT, SURGICEL POWDER 3.0GRAMS

## (undated) DEVICE — PAD, GROUNDING, ELECTROSURGICAL, W/9 FT CABLE, POLYHESIVE II, ADULT, LF

## (undated) DEVICE — SPONGE, HEMOSTAT, SURGICEL FIBRILLAR, ABS, 4 X 4, LF

## (undated) DEVICE — TUBING, SUCTION, CONNECTING, STERILE 0.25 X 120 IN., LF

## (undated) DEVICE — CAUTERY, PENCIL, PUSH BUTTON, SMOKE EVAC, 70MM

## (undated) DEVICE — BLADE, MILL, MEDIUM

## (undated) DEVICE — DRESSING, MEPILEX, POST OP, 8 X 4

## (undated) DEVICE — DRESSING, MEPILEX, BORDER FLEX, 6 X 8

## (undated) DEVICE — COVER, CART, 45 X 27 X 48 IN, CLEAR

## (undated) DEVICE — TAPE, SILK, DURAPORE, 3 IN X 10 YD, LF

## (undated) DEVICE — TUBING, SMOKE EVAC, 3/8 X 10 FT

## (undated) DEVICE — DRAIN, WOUND, ROUND, W/TROCAR, HOLE PATTERN, 10 IN, MEDIUM/LARGE, 3/16 X 49 IN

## (undated) DEVICE — SUTURE, VICRYL, 0, 18 IN, UNDYED

## (undated) DEVICE — WOUND SYSTEM, DEBRIDEMENT & CLEANING, O.R DUOPAK

## (undated) DEVICE — APPLICATOR, CHLORAPREP, W/ORANGE TINT, 26ML

## (undated) DEVICE — ELECTRODE, ELECTROSURGICAL, BLADE, INSULATED, ENT/IMA, STERILE

## (undated) DEVICE — KIT, PATIENT CARE, JACKSON TABLE W/PRONE-SAFE HEADREST

## (undated) DEVICE — MARKER, SKIN, RULER AND LABEL PACK, CUSTOM

## (undated) DEVICE — SEALANT, HEMOSTATIC, FLOSEAL, 10 ML

## (undated) DEVICE — DRAPE, SHEET, FAN FOLDED, HALF, 44 X 58 IN, DISPOSABLE, LF, STERILE

## (undated) DEVICE — ELECTRODE, ELECTROSURGICAL, BLADE EXT 4 INCH, INSULATED

## (undated) DEVICE — ELECTRODE, CORKSCREW NEEDLE 1.5M LENGTH

## (undated) DEVICE — SUTURE, VICRYL, 4-0, 18 IN, UNDYED BR PS-2

## (undated) DEVICE — NEEDLE, ELECTRODE, SUBDERMAL, PAIRED, 2.0 LEAD, DISP